# Patient Record
Sex: MALE | Race: WHITE | NOT HISPANIC OR LATINO | Employment: OTHER | URBAN - METROPOLITAN AREA
[De-identification: names, ages, dates, MRNs, and addresses within clinical notes are randomized per-mention and may not be internally consistent; named-entity substitution may affect disease eponyms.]

---

## 2017-02-10 ENCOUNTER — HISTORICAL (OUTPATIENT)
Dept: ADMINISTRATIVE | Facility: HOSPITAL | Age: 57
End: 2017-02-10

## 2017-06-20 ENCOUNTER — HISTORICAL (OUTPATIENT)
Dept: ADMINISTRATIVE | Facility: HOSPITAL | Age: 57
End: 2017-06-20

## 2017-06-20 LAB
ABS NEUT (OLG): 3.04 X10(3)/MCL (ref 2.1–9.2)
ALBUMIN SERPL-MCNC: 4.3 GM/DL (ref 3.4–5)
ALBUMIN/GLOB SERPL: 1.3 {RATIO}
ALP SERPL-CCNC: 75 UNIT/L (ref 50–136)
ALT SERPL-CCNC: 57 UNIT/L (ref 12–78)
AST SERPL-CCNC: 34 UNIT/L (ref 15–37)
BASOPHILS # BLD AUTO: 0 X10(3)/MCL (ref 0–0.2)
BASOPHILS NFR BLD AUTO: 1 %
BILIRUB SERPL-MCNC: 0.7 MG/DL (ref 0.2–1)
BILIRUBIN DIRECT+TOT PNL SERPL-MCNC: 0.2 MG/DL (ref 0–0.2)
BILIRUBIN DIRECT+TOT PNL SERPL-MCNC: 0.5 MG/DL (ref 0–0.8)
BUN SERPL-MCNC: 16 MG/DL (ref 7–18)
CALCIUM SERPL-MCNC: 9.4 MG/DL (ref 8.5–10.1)
CD3+CD4+ CELLS # SPEC: 844 UNIT/L (ref 589–1505)
CD3+CD4+ CELLS NFR BLD: 34.9 % (ref 31–59)
CHLORIDE SERPL-SCNC: 104 MMOL/L (ref 98–107)
CO2 SERPL-SCNC: 26 MMOL/L (ref 21–32)
CREAT SERPL-MCNC: 0.96 MG/DL (ref 0.7–1.3)
EOSINOPHIL # BLD AUTO: 0.1 X10(3)/MCL (ref 0–0.9)
EOSINOPHIL NFR BLD AUTO: 2 %
ERYTHROCYTE [DISTWIDTH] IN BLOOD BY AUTOMATED COUNT: 12.3 % (ref 11.5–17)
GLOBULIN SER-MCNC: 3.3 GM/DL (ref 2.4–3.5)
GLUCOSE SERPL-MCNC: 132 MG/DL (ref 74–106)
HCT VFR BLD AUTO: 47.4 % (ref 42–52)
HGB BLD-MCNC: 16.6 GM/DL (ref 14–18)
LYMPHOCYTES # BLD AUTO: 2.4 X10(3)/MCL (ref 0.6–4.6)
LYMPHOCYTES # BLD AUTO: 2418 UNIT/L (ref 1260–5520)
LYMPHOCYTES NFR BLD AUTO: 39 %
LYMPHOCYTES NFR LN MANUAL: 39 % (ref 28–48)
LYMPHOMA - T-CELL MARKERS SPEC-IMP: NORMAL
MCH RBC QN AUTO: 32.4 PG (ref 27–31)
MCHC RBC AUTO-ENTMCNC: 35 GM/DL (ref 33–36)
MCV RBC AUTO: 92.4 FL (ref 80–94)
MONOCYTES # BLD AUTO: 0.5 X10(3)/MCL (ref 0.1–1.3)
MONOCYTES NFR BLD AUTO: 9 %
NEG CONT SPOT COUNT: 0
NEUTROPHILS # BLD AUTO: 3.04 X10(3)/MCL (ref 1.4–7.9)
NEUTROPHILS NFR BLD AUTO: 49 %
PANEL A SPOT COUNT: 0
PANEL B SPOT COUNT: 0
PLATELET # BLD AUTO: 269 X10(3)/MCL (ref 130–400)
PMV BLD AUTO: 9.4 FL (ref 9.4–12.4)
POS CONT SPOT COUNT: NORMAL
POTASSIUM SERPL-SCNC: 4.2 MMOL/L (ref 3.5–5.1)
PROT SERPL-MCNC: 7.6 GM/DL (ref 6.4–8.2)
RBC # BLD AUTO: 5.13 X10(6)/MCL (ref 4.7–6.1)
RPR SER QL: NORMAL
SODIUM SERPL-SCNC: 138 MMOL/L (ref 136–145)
T-SPOT.TB: NEGATIVE
TSH SERPL-ACNC: 1.59 MIU/ML (ref 0.36–3.74)
WBC # BLD AUTO: 6200 /MM3 (ref 4500–11500)
WBC # SPEC AUTO: 6.2 X10(3)/MCL (ref 4.5–11.5)

## 2017-06-21 ENCOUNTER — HISTORICAL (OUTPATIENT)
Dept: ADMINISTRATIVE | Facility: HOSPITAL | Age: 57
End: 2017-06-21

## 2017-06-21 LAB
APPEARANCE, UA: CLEAR
BACTERIA #/AREA URNS AUTO: ABNORMAL /[HPF]
BILIRUB UR QL STRIP: NEGATIVE
COLOR UR: NORMAL
GLUCOSE (UA): NORMAL
HGB UR QL STRIP: NEGATIVE
HYALINE CASTS #/AREA URNS LPF: ABNORMAL /[LPF]
KETONES UR QL STRIP: NEGATIVE
LEUKOCYTE ESTERASE UR QL STRIP: NEGATIVE
NITRITE UR QL STRIP: NEGATIVE
PH UR STRIP: 5 [PH] (ref 4.5–8)
PROT UR QL STRIP: NEGATIVE
RBC #/AREA URNS AUTO: ABNORMAL /[HPF]
SP GR UR STRIP: 1.02 (ref 1–1.03)
SQUAMOUS #/AREA URNS LPF: ABNORMAL /[LPF]
UROBILINOGEN UR STRIP-ACNC: NORMAL
WBC #/AREA URNS AUTO: ABNORMAL /HPF

## 2017-08-17 ENCOUNTER — HISTORICAL (OUTPATIENT)
Dept: ADMINISTRATIVE | Facility: HOSPITAL | Age: 57
End: 2017-08-17

## 2017-09-18 ENCOUNTER — HISTORICAL (OUTPATIENT)
Dept: ADMINISTRATIVE | Facility: HOSPITAL | Age: 57
End: 2017-09-18

## 2017-09-18 LAB
ABS NEUT (OLG): 3.19 X10(3)/MCL (ref 2.1–9.2)
ALBUMIN SERPL-MCNC: 4.3 GM/DL (ref 3.4–5)
ALBUMIN/GLOB SERPL: 1.2 RATIO (ref 1.1–2)
ALP SERPL-CCNC: 80 UNIT/L (ref 50–136)
ALT SERPL-CCNC: 74 UNIT/L (ref 12–78)
AST SERPL-CCNC: 47 UNIT/L (ref 15–37)
BASOPHILS # BLD AUTO: 0 X10(3)/MCL (ref 0–0.2)
BASOPHILS NFR BLD AUTO: 1 %
BILIRUB SERPL-MCNC: 0.6 MG/DL (ref 0.2–1)
BILIRUBIN DIRECT+TOT PNL SERPL-MCNC: 0.1 MG/DL (ref 0–0.5)
BILIRUBIN DIRECT+TOT PNL SERPL-MCNC: 0.5 MG/DL (ref 0–0.8)
BUN SERPL-MCNC: 14 MG/DL (ref 7–18)
CALCIUM SERPL-MCNC: 10 MG/DL (ref 8.5–10.1)
CD3+CD4+ CELLS # SPEC: NORMAL UNIT/L (ref 589–1505)
CD3+CD4+ CELLS NFR BLD: 35 % (ref 31–59)
CHLORIDE SERPL-SCNC: 104 MMOL/L (ref 98–107)
CO2 SERPL-SCNC: 27 MMOL/L (ref 21–32)
CREAT SERPL-MCNC: 0.99 MG/DL (ref 0.7–1.3)
EOSINOPHIL # BLD AUTO: 0.1 X10(3)/MCL (ref 0–0.9)
EOSINOPHIL NFR BLD AUTO: 2 %
ERYTHROCYTE [DISTWIDTH] IN BLOOD BY AUTOMATED COUNT: 12.6 % (ref 11.5–17)
GLOBULIN SER-MCNC: 3.6 GM/DL (ref 2.4–3.5)
GLUCOSE SERPL-MCNC: 125 MG/DL (ref 74–106)
HCT VFR BLD AUTO: 47.9 % (ref 42–52)
HGB BLD-MCNC: 16.7 GM/DL (ref 14–18)
LYMPHOCYTES # BLD AUTO: 2.6 X10(3)/MCL (ref 0.6–4.6)
LYMPHOCYTES # BLD AUTO: 2640 UNIT/L (ref 1260–5520)
LYMPHOCYTES NFR BLD AUTO: 40 %
LYMPHOCYTES NFR LN MANUAL: 40 % (ref 28–48)
LYMPHOMA - T-CELL MARKERS SPEC-IMP: NORMAL
MCH RBC QN AUTO: 32.9 PG (ref 27–31)
MCHC RBC AUTO-ENTMCNC: 34.9 GM/DL (ref 33–36)
MCV RBC AUTO: 94.5 FL (ref 80–94)
MONOCYTES # BLD AUTO: 0.6 X10(3)/MCL (ref 0.1–1.3)
MONOCYTES NFR BLD AUTO: 8 %
NEUTROPHILS # BLD AUTO: 3.19 X10(3)/MCL (ref 1.4–7.9)
NEUTROPHILS NFR BLD AUTO: 48 %
PLATELET # BLD AUTO: 274 X10(3)/MCL (ref 130–400)
PMV BLD AUTO: 8.9 FL (ref 9.4–12.4)
POTASSIUM SERPL-SCNC: 4.5 MMOL/L (ref 3.5–5.1)
PROT SERPL-MCNC: 7.9 GM/DL (ref 6.4–8.2)
RBC # BLD AUTO: 5.07 X10(6)/MCL (ref 4.7–6.1)
SODIUM SERPL-SCNC: 141 MMOL/L (ref 136–145)
WBC # BLD AUTO: 6600 /MM3 (ref 4500–11500)
WBC # SPEC AUTO: 6.6 X10(3)/MCL (ref 4.5–11.5)

## 2018-01-05 ENCOUNTER — HISTORICAL (OUTPATIENT)
Dept: ADMINISTRATIVE | Facility: HOSPITAL | Age: 58
End: 2018-01-05

## 2018-01-05 LAB
ABS NEUT (OLG): 2.49 X10(3)/MCL (ref 2.1–9.2)
ABS NEUT (OLG): 2.55 X10(3)/MCL (ref 2.1–9.2)
ALBUMIN SERPL-MCNC: 4.1 GM/DL (ref 3.4–5)
ALBUMIN/GLOB SERPL: 1.2 RATIO (ref 1.1–2)
ALP SERPL-CCNC: 72 UNIT/L (ref 50–136)
ALT SERPL-CCNC: 75 UNIT/L (ref 12–78)
AST SERPL-CCNC: 43 UNIT/L (ref 15–37)
BASOPHILS # BLD AUTO: 0 X10(3)/MCL (ref 0–0.2)
BASOPHILS # BLD AUTO: 0 X10(3)/MCL (ref 0–0.2)
BASOPHILS NFR BLD AUTO: 1 %
BASOPHILS NFR BLD AUTO: 1 %
BILIRUB SERPL-MCNC: 0.7 MG/DL (ref 0.2–1)
BILIRUBIN DIRECT+TOT PNL SERPL-MCNC: 0.2 MG/DL (ref 0–0.5)
BILIRUBIN DIRECT+TOT PNL SERPL-MCNC: 0.5 MG/DL (ref 0–0.8)
BUN SERPL-MCNC: 18 MG/DL (ref 7–18)
CALCIUM SERPL-MCNC: 9.4 MG/DL (ref 8.5–10.1)
CD3+CD4+ CELLS # SPEC: 799 UNIT/L (ref 589–1505)
CD3+CD4+ CELLS NFR BLD: 38.4 % (ref 31–59)
CHLORIDE SERPL-SCNC: 101 MMOL/L (ref 98–107)
CHOLEST SERPL-MCNC: 152 MG/DL (ref 0–200)
CHOLEST/HDLC SERPL: 3.1 {RATIO} (ref 0–5)
CO2 SERPL-SCNC: 25 MMOL/L (ref 21–32)
CREAT SERPL-MCNC: 0.84 MG/DL (ref 0.7–1.3)
DEPRECATED CALCIDIOL+CALCIFEROL SERPL-MC: 39.29 NG/ML (ref 30–80)
EOSINOPHIL # BLD AUTO: 0.1 X10(3)/MCL (ref 0–0.9)
EOSINOPHIL # BLD AUTO: 0.1 X10(3)/MCL (ref 0–0.9)
EOSINOPHIL NFR BLD AUTO: 1 %
EOSINOPHIL NFR BLD AUTO: 2 %
ERYTHROCYTE [DISTWIDTH] IN BLOOD BY AUTOMATED COUNT: 12.3 % (ref 11.5–17)
ERYTHROCYTE [DISTWIDTH] IN BLOOD BY AUTOMATED COUNT: 12.3 % (ref 11.5–17)
EST. AVERAGE GLUCOSE BLD GHB EST-MCNC: 154 MG/DL
GLOBULIN SER-MCNC: 3.3 GM/DL (ref 2.4–3.5)
GLUCOSE SERPL-MCNC: 168 MG/DL (ref 74–106)
HBA1C MFR BLD: 7 % (ref 4.2–6.3)
HCT VFR BLD AUTO: 45.1 % (ref 42–52)
HCT VFR BLD AUTO: 46.4 % (ref 42–52)
HCV AB SERPL QL IA: NEGATIVE
HDLC SERPL-MCNC: 49 MG/DL (ref 35–60)
HGB BLD-MCNC: 15.9 GM/DL (ref 14–18)
HGB BLD-MCNC: 15.9 GM/DL (ref 14–18)
LDLC SERPL CALC-MCNC: 47 MG/DL (ref 0–129)
LYMPHOCYTES # BLD AUTO: 2.1 X10(3)/MCL (ref 0.6–4.6)
LYMPHOCYTES # BLD AUTO: 2.3 X10(3)/MCL (ref 0.6–4.6)
LYMPHOCYTES # BLD AUTO: 2080 UNIT/L (ref 1260–5520)
LYMPHOCYTES NFR BLD AUTO: 40 %
LYMPHOCYTES NFR BLD AUTO: 41 %
LYMPHOCYTES NFR LN MANUAL: 40 % (ref 28–48)
LYMPHOMA - T-CELL MARKERS SPEC-IMP: NORMAL
MCH RBC QN AUTO: 31.5 PG (ref 27–31)
MCH RBC QN AUTO: 32 PG (ref 27–31)
MCHC RBC AUTO-ENTMCNC: 34.3 GM/DL (ref 33–36)
MCHC RBC AUTO-ENTMCNC: 35.3 GM/DL (ref 33–36)
MCV RBC AUTO: 90.7 FL (ref 80–94)
MCV RBC AUTO: 91.9 FL (ref 80–94)
MONOCYTES # BLD AUTO: 0.5 X10(3)/MCL (ref 0.1–1.3)
MONOCYTES # BLD AUTO: 0.6 X10(3)/MCL (ref 0.1–1.3)
MONOCYTES NFR BLD AUTO: 10 %
MONOCYTES NFR BLD AUTO: 9 %
NEUTROPHILS # BLD AUTO: 2.49 X10(3)/MCL (ref 2.1–9.2)
NEUTROPHILS # BLD AUTO: 2.55 X10(3)/MCL (ref 1.4–7.9)
NEUTROPHILS NFR BLD AUTO: 46 %
NEUTROPHILS NFR BLD AUTO: 48 %
PLATELET # BLD AUTO: 246 X10(3)/MCL (ref 130–400)
PLATELET # BLD AUTO: 258 X10(3)/MCL (ref 130–400)
PMV BLD AUTO: 9.3 FL (ref 9.4–12.4)
PMV BLD AUTO: 9.4 FL (ref 9.4–12.4)
POTASSIUM SERPL-SCNC: 4.1 MMOL/L (ref 3.5–5.1)
PROT SERPL-MCNC: 7.4 GM/DL (ref 6.4–8.2)
RBC # BLD AUTO: 4.97 X10(6)/MCL (ref 4.7–6.1)
RBC # BLD AUTO: 5.05 X10(6)/MCL (ref 4.7–6.1)
RPR SER QL: REACTIVE
SODIUM SERPL-SCNC: 135 MMOL/L (ref 136–145)
TRIGL SERPL-MCNC: 279 MG/DL (ref 30–150)
VLDLC SERPL CALC-MCNC: 56 MG/DL
WBC # BLD AUTO: 5200 /MM3 (ref 4500–11500)
WBC # SPEC AUTO: 5.2 X10(3)/MCL (ref 4.5–11.5)
WBC # SPEC AUTO: 5.6 X10(3)/MCL (ref 4.5–11.5)

## 2018-02-19 ENCOUNTER — HISTORICAL (OUTPATIENT)
Dept: BARIATRICS | Facility: HOSPITAL | Age: 58
End: 2018-02-19

## 2018-03-29 ENCOUNTER — HISTORICAL (OUTPATIENT)
Dept: BARIATRICS | Facility: HOSPITAL | Age: 58
End: 2018-03-29

## 2018-04-04 ENCOUNTER — HISTORICAL (OUTPATIENT)
Dept: ADMINISTRATIVE | Facility: HOSPITAL | Age: 58
End: 2018-04-04

## 2018-04-04 LAB
ABS NEUT (OLG): 3.09 X10(3)/MCL (ref 2.1–9.2)
ALBUMIN SERPL-MCNC: 4.4 GM/DL (ref 3.4–5)
ALBUMIN/GLOB SERPL: 1.3 RATIO (ref 1.1–2)
ALP SERPL-CCNC: 82 UNIT/L (ref 50–136)
ALT SERPL-CCNC: 68 UNIT/L (ref 12–78)
AST SERPL-CCNC: 42 UNIT/L (ref 15–37)
BASOPHILS # BLD AUTO: 0 X10(3)/MCL (ref 0–0.2)
BASOPHILS NFR BLD AUTO: 1 %
BILIRUB SERPL-MCNC: 0.6 MG/DL (ref 0.2–1)
BILIRUBIN DIRECT+TOT PNL SERPL-MCNC: 0.2 MG/DL (ref 0–0.5)
BILIRUBIN DIRECT+TOT PNL SERPL-MCNC: 0.4 MG/DL (ref 0–0.8)
BUN SERPL-MCNC: 22 MG/DL (ref 7–18)
CALCIUM SERPL-MCNC: 9.9 MG/DL (ref 8.5–10.1)
CD3+CD4+ CELLS # SPEC: 702 UNIT/L (ref 589–1505)
CD3+CD4+ CELLS NFR BLD: 35 % (ref 31–59)
CHLORIDE SERPL-SCNC: 100 MMOL/L (ref 98–107)
CHOLEST SERPL-MCNC: 155 MG/DL (ref 0–200)
CHOLEST/HDLC SERPL: 3.8 {RATIO} (ref 0–5)
CO2 SERPL-SCNC: 27 MMOL/L (ref 21–32)
CREAT SERPL-MCNC: 1.12 MG/DL (ref 0.7–1.3)
EOSINOPHIL # BLD AUTO: 0.1 X10(3)/MCL (ref 0–0.9)
EOSINOPHIL NFR BLD AUTO: 2 %
ERYTHROCYTE [DISTWIDTH] IN BLOOD BY AUTOMATED COUNT: 12.9 % (ref 11.5–17)
EST. AVERAGE GLUCOSE BLD GHB EST-MCNC: 140 MG/DL
GLOBULIN SER-MCNC: 3.5 GM/DL (ref 2.4–3.5)
GLUCOSE SERPL-MCNC: 142 MG/DL (ref 74–106)
HBA1C MFR BLD: 6.5 % (ref 4.2–6.3)
HCT VFR BLD AUTO: 45.3 % (ref 42–52)
HDLC SERPL-MCNC: 41 MG/DL (ref 35–60)
HGB BLD-MCNC: 15.4 GM/DL (ref 14–18)
LDLC SERPL CALC-MCNC: 68 MG/DL (ref 0–129)
LYMPHOCYTES # BLD AUTO: 2 X10(3)/MCL (ref 0.6–4.6)
LYMPHOCYTES # BLD AUTO: 2006 UNIT/L (ref 1260–5520)
LYMPHOCYTES NFR BLD AUTO: 34 %
LYMPHOCYTES NFR LN MANUAL: 34 % (ref 28–48)
LYMPHOMA - T-CELL MARKERS SPEC-IMP: NORMAL
MCH RBC QN AUTO: 32.8 PG (ref 27–31)
MCHC RBC AUTO-ENTMCNC: 34 GM/DL (ref 33–36)
MCV RBC AUTO: 96.6 FL (ref 80–94)
MONOCYTES # BLD AUTO: 0.6 X10(3)/MCL (ref 0.1–1.3)
MONOCYTES NFR BLD AUTO: 10 %
NEUTROPHILS # BLD AUTO: 3.09 X10(3)/MCL (ref 1.4–7.9)
NEUTROPHILS NFR BLD AUTO: 52 %
PLATELET # BLD AUTO: 292 X10(3)/MCL (ref 130–400)
PMV BLD AUTO: 9.5 FL (ref 9.4–12.4)
POTASSIUM SERPL-SCNC: 4.3 MMOL/L (ref 3.5–5.1)
PROT SERPL-MCNC: 7.9 GM/DL (ref 6.4–8.2)
PSA SERPL-MCNC: 0.5 NG/ML (ref 0–4)
RBC # BLD AUTO: 4.69 X10(6)/MCL (ref 4.7–6.1)
RPR SER QL: NORMAL
SODIUM SERPL-SCNC: 137 MMOL/L (ref 136–145)
TRIGL SERPL-MCNC: 229 MG/DL (ref 30–150)
VLDLC SERPL CALC-MCNC: 46 MG/DL
WBC # BLD AUTO: 5900 /MM3 (ref 4500–11500)
WBC # SPEC AUTO: 5.9 X10(3)/MCL (ref 4.5–11.5)

## 2018-04-25 ENCOUNTER — HISTORICAL (OUTPATIENT)
Dept: BARIATRICS | Facility: HOSPITAL | Age: 58
End: 2018-04-25

## 2018-05-30 ENCOUNTER — HISTORICAL (OUTPATIENT)
Dept: BARIATRICS | Facility: HOSPITAL | Age: 58
End: 2018-05-30

## 2018-06-20 ENCOUNTER — HISTORICAL (OUTPATIENT)
Dept: ADMINISTRATIVE | Facility: HOSPITAL | Age: 58
End: 2018-06-20

## 2018-06-20 LAB — H PYLORI AB SER IA-ACNC: NEGATIVE

## 2018-06-27 ENCOUNTER — HISTORICAL (OUTPATIENT)
Dept: BARIATRICS | Facility: HOSPITAL | Age: 58
End: 2018-06-27

## 2018-07-18 ENCOUNTER — HISTORICAL (OUTPATIENT)
Dept: ADMINISTRATIVE | Facility: HOSPITAL | Age: 58
End: 2018-07-18

## 2018-07-18 LAB
ABS NEUT (OLG): 2.97 X10(3)/MCL (ref 2.1–9.2)
ALBUMIN SERPL-MCNC: 4 GM/DL (ref 3.4–5)
ALBUMIN/GLOB SERPL: 1.1 RATIO (ref 1.1–2)
ALP SERPL-CCNC: 82 UNIT/L (ref 50–136)
ALT SERPL-CCNC: 88 UNIT/L (ref 12–78)
AST SERPL-CCNC: 49 UNIT/L (ref 15–37)
BASOPHILS # BLD AUTO: 0 X10(3)/MCL (ref 0–0.2)
BASOPHILS NFR BLD AUTO: 1 %
BILIRUB SERPL-MCNC: 0.4 MG/DL (ref 0.2–1)
BILIRUBIN DIRECT+TOT PNL SERPL-MCNC: 0.1 MG/DL (ref 0–0.5)
BILIRUBIN DIRECT+TOT PNL SERPL-MCNC: 0.3 MG/DL (ref 0–0.8)
BUN SERPL-MCNC: 18 MG/DL (ref 7–18)
CALCIUM SERPL-MCNC: 9.4 MG/DL (ref 8.5–10.1)
CD3+CD4+ CELLS # SPEC: 816 UNIT/L (ref 589–1505)
CD3+CD4+ CELLS NFR BLD: 37.4 % (ref 31–59)
CHLORIDE SERPL-SCNC: 107 MMOL/L (ref 98–107)
CO2 SERPL-SCNC: 26 MMOL/L (ref 21–32)
CREAT SERPL-MCNC: 0.92 MG/DL (ref 0.7–1.3)
EOSINOPHIL # BLD AUTO: 0.1 X10(3)/MCL (ref 0–0.9)
EOSINOPHIL NFR BLD AUTO: 2 %
ERYTHROCYTE [DISTWIDTH] IN BLOOD BY AUTOMATED COUNT: 12.1 % (ref 11.5–17)
GLOBULIN SER-MCNC: 3.6 GM/DL (ref 2.4–3.5)
GLUCOSE SERPL-MCNC: 125 MG/DL (ref 74–106)
HCT VFR BLD AUTO: 45.6 % (ref 42–52)
HGB BLD-MCNC: 15.4 GM/DL (ref 14–18)
LYMPHOCYTES # BLD AUTO: 2.2 X10(3)/MCL (ref 0.6–4.6)
LYMPHOCYTES # BLD AUTO: 2183 UNIT/L (ref 1260–5520)
LYMPHOCYTES NFR BLD AUTO: 37 %
LYMPHOCYTES NFR LN MANUAL: 37 % (ref 28–48)
LYMPHOMA - T-CELL MARKERS SPEC-IMP: NORMAL
MCH RBC QN AUTO: 32 PG (ref 27–31)
MCHC RBC AUTO-ENTMCNC: 33.8 GM/DL (ref 33–36)
MCV RBC AUTO: 94.8 FL (ref 80–94)
MONOCYTES # BLD AUTO: 0.6 X10(3)/MCL (ref 0.1–1.3)
MONOCYTES NFR BLD AUTO: 9 %
NEUTROPHILS # BLD AUTO: 2.97 X10(3)/MCL (ref 2.1–9.2)
NEUTROPHILS NFR BLD AUTO: 50 %
PLATELET # BLD AUTO: 246 X10(3)/MCL (ref 130–400)
PMV BLD AUTO: 9.4 FL (ref 9.4–12.4)
POTASSIUM SERPL-SCNC: 4.5 MMOL/L (ref 3.5–5.1)
PROT SERPL-MCNC: 7.6 GM/DL (ref 6.4–8.2)
RBC # BLD AUTO: 4.81 X10(6)/MCL (ref 4.7–6.1)
SODIUM SERPL-SCNC: 140 MMOL/L (ref 136–145)
WBC # BLD AUTO: 5900 /MM3 (ref 4500–11500)
WBC # SPEC AUTO: 5.9 X10(3)/MCL (ref 4.5–11.5)

## 2018-07-26 ENCOUNTER — HISTORICAL (OUTPATIENT)
Dept: SURGERY | Facility: HOSPITAL | Age: 58
End: 2018-07-26

## 2018-07-30 ENCOUNTER — HISTORICAL (OUTPATIENT)
Dept: BARIATRICS | Facility: HOSPITAL | Age: 58
End: 2018-07-30

## 2018-10-02 ENCOUNTER — HISTORICAL (OUTPATIENT)
Dept: BARIATRICS | Facility: HOSPITAL | Age: 58
End: 2018-10-02

## 2018-10-22 ENCOUNTER — HISTORICAL (OUTPATIENT)
Dept: ADMINISTRATIVE | Facility: HOSPITAL | Age: 58
End: 2018-10-22

## 2018-10-22 LAB
ABS NEUT (OLG): 2.27 X10(3)/MCL (ref 2.1–9.2)
ALBUMIN SERPL-MCNC: 4.4 GM/DL (ref 3.4–5)
ALBUMIN/GLOB SERPL: 1.2 {RATIO}
ALP SERPL-CCNC: 72 UNIT/L (ref 50–136)
ALT SERPL-CCNC: 113 UNIT/L (ref 12–78)
AST SERPL-CCNC: 78 UNIT/L (ref 15–37)
BASOPHILS # BLD AUTO: 0 X10(3)/MCL (ref 0–0.2)
BASOPHILS NFR BLD AUTO: 1 %
BILIRUB SERPL-MCNC: 1 MG/DL (ref 0.2–1)
BILIRUBIN DIRECT+TOT PNL SERPL-MCNC: 0.2 MG/DL (ref 0–0.2)
BILIRUBIN DIRECT+TOT PNL SERPL-MCNC: 0.8 MG/DL (ref 0–0.8)
BUN SERPL-MCNC: 20 MG/DL (ref 7–18)
CALCIUM SERPL-MCNC: 9.8 MG/DL (ref 8.5–10.1)
CD3+CD4+ CELLS # SPEC: 751 UNIT/L (ref 589–1505)
CD3+CD4+ CELLS NFR BLD: 38.3 % (ref 31–59)
CHLORIDE SERPL-SCNC: 103 MMOL/L (ref 98–107)
CHOLEST SERPL-MCNC: 144 MG/DL (ref 0–200)
CHOLEST/HDLC SERPL: 3.7 {RATIO} (ref 0–5)
CO2 SERPL-SCNC: 26 MMOL/L (ref 21–32)
CREAT SERPL-MCNC: 1.21 MG/DL (ref 0.7–1.3)
DEPRECATED CALCIDIOL+CALCIFEROL SERPL-MC: 37.63 NG/ML (ref 30–80)
EOSINOPHIL # BLD AUTO: 0.1 X10(3)/MCL (ref 0–0.9)
EOSINOPHIL NFR BLD AUTO: 1 %
ERYTHROCYTE [DISTWIDTH] IN BLOOD BY AUTOMATED COUNT: 12.4 % (ref 11.5–17)
EST. AVERAGE GLUCOSE BLD GHB EST-MCNC: 154 MG/DL
GLOBULIN SER-MCNC: 3.7 GM/DL (ref 2.4–3.5)
GLUCOSE SERPL-MCNC: 108 MG/DL (ref 74–106)
HBA1C MFR BLD: 7 % (ref 4.2–6.3)
HCT VFR BLD AUTO: 46.3 % (ref 42–52)
HCV AB SERPL QL IA: NEGATIVE
HDLC SERPL-MCNC: 39 MG/DL (ref 35–60)
HGB BLD-MCNC: 15.4 GM/DL (ref 14–18)
LDLC SERPL CALC-MCNC: 57 MG/DL (ref 0–129)
LYMPHOCYTES # BLD AUTO: 1960 UNIT/L (ref 1260–5520)
LYMPHOCYTES # BLD AUTO: 2 X10(3)/MCL (ref 0.6–4.6)
LYMPHOCYTES NFR BLD AUTO: 40 %
LYMPHOCYTES NFR LN MANUAL: 40 % (ref 28–48)
LYMPHOMA - T-CELL MARKERS SPEC-IMP: NORMAL
MCH RBC QN AUTO: 32.2 PG (ref 27–31)
MCHC RBC AUTO-ENTMCNC: 33.3 GM/DL (ref 33–36)
MCV RBC AUTO: 96.9 FL (ref 80–94)
MONOCYTES # BLD AUTO: 0.5 X10(3)/MCL (ref 0.1–1.3)
MONOCYTES NFR BLD AUTO: 11 %
NEG CONT SPOT COUNT: NORMAL
NEUTROPHILS # BLD AUTO: 2.27 X10(3)/MCL (ref 2.1–9.2)
NEUTROPHILS NFR BLD AUTO: 46 %
PANEL A SPOT COUNT: 0
PANEL B SPOT COUNT: 0
PLATELET # BLD AUTO: 276 X10(3)/MCL (ref 130–400)
PMV BLD AUTO: 9.2 FL (ref 9.4–12.4)
POS CONT SPOT COUNT: NORMAL
POTASSIUM SERPL-SCNC: 4.3 MMOL/L (ref 3.5–5.1)
PROT SERPL-MCNC: 8.1 GM/DL (ref 6.4–8.2)
RBC # BLD AUTO: 4.78 X10(6)/MCL (ref 4.7–6.1)
RPR SER QL: REACTIVE
SODIUM SERPL-SCNC: 139 MMOL/L (ref 136–145)
T PALLIDUM AB SER QL: ABNORMAL
T-SPOT.TB: NORMAL
TRIGL SERPL-MCNC: 241 MG/DL (ref 30–150)
TSH SERPL-ACNC: 3.21 MIU/L (ref 0.36–3.74)
VLDLC SERPL CALC-MCNC: 48 MG/DL
WBC # BLD AUTO: 4900 /MM3 (ref 4500–11500)
WBC # SPEC AUTO: 4.9 X10(3)/MCL (ref 4.5–11.5)

## 2018-10-23 ENCOUNTER — HISTORICAL (OUTPATIENT)
Dept: BARIATRICS | Facility: HOSPITAL | Age: 58
End: 2018-10-23

## 2018-10-23 ENCOUNTER — HISTORICAL (OUTPATIENT)
Dept: LAB | Facility: HOSPITAL | Age: 58
End: 2018-10-23

## 2018-10-24 LAB — GRAM STN SPEC: NORMAL

## 2018-10-26 LAB
FINAL CULTURE: NO GROWTH
FINAL CULTURE: NORMAL

## 2018-11-12 ENCOUNTER — HISTORICAL (OUTPATIENT)
Dept: BARIATRICS | Facility: HOSPITAL | Age: 58
End: 2018-11-12

## 2018-12-19 ENCOUNTER — HISTORICAL (OUTPATIENT)
Dept: ADMINISTRATIVE | Facility: HOSPITAL | Age: 58
End: 2018-12-19

## 2018-12-19 LAB
ALBUMIN SERPL-MCNC: 4.2 GM/DL (ref 3.4–5)
ALBUMIN/GLOB SERPL: 1.3 {RATIO}
ALP SERPL-CCNC: 73 UNIT/L (ref 50–136)
ALT SERPL-CCNC: 61 UNIT/L (ref 12–78)
AST SERPL-CCNC: 37 UNIT/L (ref 15–37)
BILIRUB SERPL-MCNC: 0.7 MG/DL (ref 0.2–1)
BILIRUBIN DIRECT+TOT PNL SERPL-MCNC: 0.2 MG/DL (ref 0–0.2)
BILIRUBIN DIRECT+TOT PNL SERPL-MCNC: 0.5 MG/DL (ref 0–0.8)
BUN SERPL-MCNC: 17 MG/DL (ref 7–18)
CALCIUM SERPL-MCNC: 9.6 MG/DL (ref 8.5–10.1)
CHLORIDE SERPL-SCNC: 102 MMOL/L (ref 98–107)
CO2 SERPL-SCNC: 27 MMOL/L (ref 21–32)
CREAT SERPL-MCNC: 1.02 MG/DL (ref 0.7–1.3)
ERYTHROCYTE [DISTWIDTH] IN BLOOD BY AUTOMATED COUNT: 13 % (ref 11.5–17)
EST. AVERAGE GLUCOSE BLD GHB EST-MCNC: 120 MG/DL
GLOBULIN SER-MCNC: 3.2 GM/DL (ref 2.4–3.5)
GLUCOSE SERPL-MCNC: 115 MG/DL (ref 74–106)
HBA1C MFR BLD: 5.8 % (ref 4.2–6.3)
HCT VFR BLD AUTO: 43.9 % (ref 42–52)
HGB BLD-MCNC: 14.5 GM/DL (ref 14–18)
IRON SATN MFR SERPL: 36.1 % (ref 20–50)
IRON SERPL-MCNC: 132 MCG/DL (ref 50–175)
MCH RBC QN AUTO: 32.1 PG (ref 27–31)
MCHC RBC AUTO-ENTMCNC: 33 GM/DL (ref 33–36)
MCV RBC AUTO: 97.1 FL (ref 80–94)
PLATELET # BLD AUTO: 259 X10(3)/MCL (ref 130–400)
PMV BLD AUTO: 9.6 FL (ref 9.4–12.4)
POTASSIUM SERPL-SCNC: 4.3 MMOL/L (ref 3.5–5.1)
PROT SERPL-MCNC: 7.4 GM/DL (ref 6.4–8.2)
RBC # BLD AUTO: 4.52 X10(6)/MCL (ref 4.7–6.1)
SODIUM SERPL-SCNC: 136 MMOL/L (ref 136–145)
TIBC SERPL-MCNC: 366 MCG/DL (ref 250–450)
TRANSFERRIN SERPL-MCNC: 281 MG/DL (ref 200–360)
VIT B12 SERPL-MCNC: 423 PG/ML (ref 193–986)
WBC # SPEC AUTO: 5 X10(3)/MCL (ref 4.5–11.5)

## 2018-12-26 ENCOUNTER — HISTORICAL (OUTPATIENT)
Dept: BARIATRICS | Facility: HOSPITAL | Age: 58
End: 2018-12-26

## 2019-01-24 ENCOUNTER — HISTORICAL (OUTPATIENT)
Dept: ADMINISTRATIVE | Facility: HOSPITAL | Age: 59
End: 2019-01-24

## 2019-01-24 LAB
ABS NEUT (OLG): 1.72 X10(3)/MCL (ref 2.1–9.2)
BASOPHILS # BLD AUTO: 0 X10(3)/MCL (ref 0–0.2)
BASOPHILS NFR BLD AUTO: 1 %
EOSINOPHIL # BLD AUTO: 0.1 X10(3)/MCL (ref 0–0.9)
EOSINOPHIL NFR BLD AUTO: 2 %
ERYTHROCYTE [DISTWIDTH] IN BLOOD BY AUTOMATED COUNT: 12.2 % (ref 11.5–17)
EST. AVERAGE GLUCOSE BLD GHB EST-MCNC: 120 MG/DL
HBA1C MFR BLD: 5.8 % (ref 4.2–6.3)
HCT VFR BLD AUTO: 45.5 % (ref 42–52)
HGB BLD-MCNC: 15.1 GM/DL (ref 14–18)
LYMPHOCYTES # BLD AUTO: 2.6 X10(3)/MCL (ref 0.6–4.6)
LYMPHOCYTES NFR BLD AUTO: 53 %
MCH RBC QN AUTO: 31.8 PG (ref 27–31)
MCHC RBC AUTO-ENTMCNC: 33.2 GM/DL (ref 33–36)
MCV RBC AUTO: 95.8 FL (ref 80–94)
MONOCYTES # BLD AUTO: 0.5 X10(3)/MCL (ref 0.1–1.3)
MONOCYTES NFR BLD AUTO: 10 %
NEUTROPHILS # BLD AUTO: 1.72 X10(3)/MCL (ref 2.1–9.2)
NEUTROPHILS NFR BLD AUTO: 35 %
PLATELET # BLD AUTO: 258 X10(3)/MCL (ref 130–400)
PMV BLD AUTO: 9.4 FL (ref 9.4–12.4)
PSA SERPL-MCNC: 0.5 NG/ML (ref 0–4)
RBC # BLD AUTO: 4.75 X10(6)/MCL (ref 4.7–6.1)
WBC # SPEC AUTO: 4.9 X10(3)/MCL (ref 4.5–11.5)

## 2019-02-25 ENCOUNTER — HISTORICAL (OUTPATIENT)
Dept: ADMINISTRATIVE | Facility: HOSPITAL | Age: 59
End: 2019-02-25

## 2019-02-25 LAB
ABS NEUT (OLG): 1.96 X10(3)/MCL (ref 2.1–9.2)
ALBUMIN SERPL-MCNC: 4.1 GM/DL (ref 3.4–5)
ALBUMIN/GLOB SERPL: 1.2 RATIO (ref 1.1–2)
ALP SERPL-CCNC: 80 UNIT/L (ref 50–136)
ALT SERPL-CCNC: 30 UNIT/L (ref 12–78)
AST SERPL-CCNC: 16 UNIT/L (ref 15–37)
BASOPHILS # BLD AUTO: 0 X10(3)/MCL (ref 0–0.2)
BASOPHILS NFR BLD AUTO: 1 %
BILIRUB SERPL-MCNC: 0.6 MG/DL (ref 0.2–1)
BILIRUBIN DIRECT+TOT PNL SERPL-MCNC: 0.2 MG/DL (ref 0–0.5)
BILIRUBIN DIRECT+TOT PNL SERPL-MCNC: 0.4 MG/DL (ref 0–0.8)
BUN SERPL-MCNC: 21 MG/DL (ref 7–18)
CALCIUM SERPL-MCNC: 10 MG/DL (ref 8.5–10.1)
CD3+CD4+ CELLS # SPEC: 933 UNIT/L (ref 589–1505)
CD3+CD4+ CELLS NFR BLD: 36.6 % (ref 31–59)
CHLORIDE SERPL-SCNC: 103 MMOL/L (ref 98–107)
CO2 SERPL-SCNC: 28 MMOL/L (ref 21–32)
CREAT SERPL-MCNC: 1.09 MG/DL (ref 0.7–1.3)
EOSINOPHIL # BLD AUTO: 0.1 X10(3)/MCL (ref 0–0.9)
EOSINOPHIL NFR BLD AUTO: 2 %
ERYTHROCYTE [DISTWIDTH] IN BLOOD BY AUTOMATED COUNT: 12.7 % (ref 11.5–17)
GLOBULIN SER-MCNC: 3.5 GM/DL (ref 2.4–3.5)
GLUCOSE SERPL-MCNC: 109 MG/DL (ref 74–106)
HCT VFR BLD AUTO: 43.7 % (ref 42–52)
HGB BLD-MCNC: 14.5 GM/DL (ref 14–18)
LYMPHOCYTES # BLD AUTO: 2.6 X10(3)/MCL (ref 0.6–4.6)
LYMPHOCYTES # BLD AUTO: 2550 UNIT/L (ref 1260–5520)
LYMPHOCYTES NFR BLD AUTO: 50 %
LYMPHOCYTES NFR LN MANUAL: 50 % (ref 28–48)
LYMPHOMA - T-CELL MARKERS SPEC-IMP: ABNORMAL
MCH RBC QN AUTO: 32.3 PG (ref 27–31)
MCHC RBC AUTO-ENTMCNC: 33.2 GM/DL (ref 33–36)
MCV RBC AUTO: 97.3 FL (ref 80–94)
MONOCYTES # BLD AUTO: 0.5 X10(3)/MCL (ref 0.1–1.3)
MONOCYTES NFR BLD AUTO: 9 %
NEUTROPHILS # BLD AUTO: 1.96 X10(3)/MCL (ref 2.1–9.2)
NEUTROPHILS NFR BLD AUTO: 38 %
PLATELET # BLD AUTO: 272 X10(3)/MCL (ref 130–400)
PMV BLD AUTO: 10.2 FL (ref 9.4–12.4)
POTASSIUM SERPL-SCNC: 4.1 MMOL/L (ref 3.5–5.1)
PROT SERPL-MCNC: 7.6 GM/DL (ref 6.4–8.2)
RBC # BLD AUTO: 4.49 X10(6)/MCL (ref 4.7–6.1)
SODIUM SERPL-SCNC: 139 MMOL/L (ref 136–145)
WBC # BLD AUTO: 5100 /MM3 (ref 4500–11500)
WBC # SPEC AUTO: 5.1 X10(3)/MCL (ref 4.5–11.5)

## 2019-02-28 ENCOUNTER — HISTORICAL (OUTPATIENT)
Dept: BARIATRICS | Facility: HOSPITAL | Age: 59
End: 2019-02-28

## 2019-03-13 LAB — CRC RECOMMENDATION EXT: NORMAL

## 2019-04-25 ENCOUNTER — HISTORICAL (OUTPATIENT)
Dept: ADMINISTRATIVE | Facility: HOSPITAL | Age: 59
End: 2019-04-25

## 2019-04-25 LAB
ABS NEUT (OLG): 2.18 X10(3)/MCL (ref 2.1–9.2)
ALBUMIN SERPL-MCNC: 4.1 GM/DL (ref 3.4–5)
ALBUMIN/GLOB SERPL: 1.1 RATIO (ref 1.1–2)
ALP SERPL-CCNC: 86 UNIT/L (ref 50–136)
ALT SERPL-CCNC: 26 UNIT/L (ref 12–78)
AST SERPL-CCNC: 16 UNIT/L (ref 15–37)
BASOPHILS # BLD AUTO: 0 X10(3)/MCL (ref 0–0.2)
BASOPHILS NFR BLD AUTO: 0 %
BILIRUB SERPL-MCNC: 0.6 MG/DL (ref 0.2–1)
BILIRUBIN DIRECT+TOT PNL SERPL-MCNC: 0.2 MG/DL (ref 0–0.5)
BILIRUBIN DIRECT+TOT PNL SERPL-MCNC: 0.4 MG/DL (ref 0–0.8)
BUN SERPL-MCNC: 12 MG/DL (ref 7–18)
CALCIUM SERPL-MCNC: 10 MG/DL (ref 8.5–10.1)
CHLORIDE SERPL-SCNC: 106 MMOL/L (ref 98–107)
CO2 SERPL-SCNC: 27 MMOL/L (ref 21–32)
CREAT SERPL-MCNC: 0.95 MG/DL (ref 0.7–1.3)
EOSINOPHIL # BLD AUTO: 0.1 X10(3)/MCL (ref 0–0.9)
EOSINOPHIL NFR BLD AUTO: 2 %
ERYTHROCYTE [DISTWIDTH] IN BLOOD BY AUTOMATED COUNT: 12.7 % (ref 11.5–17)
EST. AVERAGE GLUCOSE BLD GHB EST-MCNC: 114 MG/DL
GLOBULIN SER-MCNC: 3.6 GM/DL (ref 2.4–3.5)
GLUCOSE SERPL-MCNC: 88 MG/DL (ref 74–106)
HBA1C MFR BLD: 5.6 % (ref 4.2–6.3)
HCT VFR BLD AUTO: 42 % (ref 42–52)
HGB BLD-MCNC: 14.1 GM/DL (ref 14–18)
IRON SATN MFR SERPL: 34.2 % (ref 20–50)
IRON SERPL-MCNC: 125 MCG/DL (ref 50–175)
LYMPHOCYTES # BLD AUTO: 2.2 X10(3)/MCL (ref 0.6–4.6)
LYMPHOCYTES NFR BLD AUTO: 44 %
MCH RBC QN AUTO: 32.7 PG (ref 27–31)
MCHC RBC AUTO-ENTMCNC: 33.6 GM/DL (ref 33–36)
MCV RBC AUTO: 97.4 FL (ref 80–94)
MONOCYTES # BLD AUTO: 0.5 X10(3)/MCL (ref 0.1–1.3)
MONOCYTES NFR BLD AUTO: 10 %
NEUTROPHILS # BLD AUTO: 2.18 X10(3)/MCL (ref 2.1–9.2)
NEUTROPHILS NFR BLD AUTO: 44 %
PLATELET # BLD AUTO: 258 X10(3)/MCL (ref 130–400)
PMV BLD AUTO: 9.8 FL (ref 9.4–12.4)
POTASSIUM SERPL-SCNC: 4 MMOL/L (ref 3.5–5.1)
PROT SERPL-MCNC: 7.7 GM/DL (ref 6.4–8.2)
RBC # BLD AUTO: 4.31 X10(6)/MCL (ref 4.7–6.1)
SODIUM SERPL-SCNC: 140 MMOL/L (ref 136–145)
TIBC SERPL-MCNC: 366 MCG/DL (ref 250–450)
TRANSFERRIN SERPL-MCNC: 281 MG/DL (ref 200–360)
VIT B12 SERPL-MCNC: 318 PG/ML (ref 193–986)
WBC # SPEC AUTO: 4.9 X10(3)/MCL (ref 4.5–11.5)

## 2019-04-29 ENCOUNTER — HISTORICAL (OUTPATIENT)
Dept: BARIATRICS | Facility: HOSPITAL | Age: 59
End: 2019-04-29

## 2019-05-31 ENCOUNTER — HISTORICAL (OUTPATIENT)
Dept: ADMINISTRATIVE | Facility: HOSPITAL | Age: 59
End: 2019-05-31

## 2019-05-31 LAB
ABS NEUT (OLG): 2.65 X10(3)/MCL (ref 2.1–9.2)
ALBUMIN SERPL-MCNC: 4.3 GM/DL (ref 3.4–5)
ALBUMIN/GLOB SERPL: 1.2 RATIO (ref 1.1–2)
ALP SERPL-CCNC: 71 UNIT/L (ref 50–136)
ALT SERPL-CCNC: 28 UNIT/L (ref 12–78)
AST SERPL-CCNC: 16 UNIT/L (ref 15–37)
BASOPHILS # BLD AUTO: 0 X10(3)/MCL (ref 0–0.2)
BASOPHILS NFR BLD AUTO: 0 %
BILIRUB SERPL-MCNC: 0.7 MG/DL (ref 0.2–1)
BILIRUBIN DIRECT+TOT PNL SERPL-MCNC: 0.2 MG/DL (ref 0–0.5)
BILIRUBIN DIRECT+TOT PNL SERPL-MCNC: 0.5 MG/DL (ref 0–0.8)
BUN SERPL-MCNC: 19 MG/DL (ref 7–18)
CALCIUM SERPL-MCNC: 10.1 MG/DL (ref 8.5–10.1)
CD3+CD4+ CELLS # SPEC: 734 UNIT/L (ref 589–1505)
CD3+CD4+ CELLS NFR BLD: 38.9 % (ref 31–59)
CHLORIDE SERPL-SCNC: 104 MMOL/L (ref 98–107)
CHOLEST SERPL-MCNC: 170 MG/DL (ref 0–200)
CHOLEST/HDLC SERPL: 3.2 {RATIO} (ref 0–5)
CO2 SERPL-SCNC: 27 MMOL/L (ref 21–32)
CREAT SERPL-MCNC: 1.16 MG/DL (ref 0.7–1.3)
EOSINOPHIL # BLD AUTO: 0 X10(3)/MCL (ref 0–0.9)
EOSINOPHIL NFR BLD AUTO: 1 %
ERYTHROCYTE [DISTWIDTH] IN BLOOD BY AUTOMATED COUNT: 12 % (ref 11.5–17)
GLOBULIN SER-MCNC: 3.5 GM/DL (ref 2.4–3.5)
GLUCOSE SERPL-MCNC: 104 MG/DL (ref 74–106)
HCT VFR BLD AUTO: 42.4 % (ref 42–52)
HDLC SERPL-MCNC: 53 MG/DL (ref 35–60)
HGB BLD-MCNC: 14.5 GM/DL (ref 14–18)
LDLC SERPL CALC-MCNC: 75 MG/DL (ref 0–129)
LYMPHOCYTES # BLD AUTO: 1.9 X10(3)/MCL (ref 0.6–4.6)
LYMPHOCYTES # BLD AUTO: 1887 UNIT/L (ref 1260–5520)
LYMPHOCYTES NFR BLD AUTO: 37 %
LYMPHOCYTES NFR LN MANUAL: 37 % (ref 28–48)
LYMPHOMA - T-CELL MARKERS SPEC-IMP: NORMAL
MCH RBC QN AUTO: 32.6 PG (ref 27–31)
MCHC RBC AUTO-ENTMCNC: 34.2 GM/DL (ref 33–36)
MCV RBC AUTO: 95.3 FL (ref 80–94)
MONOCYTES # BLD AUTO: 0.5 X10(3)/MCL (ref 0.1–1.3)
MONOCYTES NFR BLD AUTO: 9 %
NEUTROPHILS # BLD AUTO: 2.65 X10(3)/MCL (ref 2.1–9.2)
NEUTROPHILS NFR BLD AUTO: 52 %
PLATELET # BLD AUTO: 266 X10(3)/MCL (ref 130–400)
PMV BLD AUTO: 9.8 FL (ref 9.4–12.4)
POTASSIUM SERPL-SCNC: 3.8 MMOL/L (ref 3.5–5.1)
PROT SERPL-MCNC: 7.8 GM/DL (ref 6.4–8.2)
RBC # BLD AUTO: 4.45 X10(6)/MCL (ref 4.7–6.1)
SODIUM SERPL-SCNC: 139 MMOL/L (ref 136–145)
TRIGL SERPL-MCNC: 212 MG/DL (ref 30–150)
VLDLC SERPL CALC-MCNC: 42 MG/DL
WBC # BLD AUTO: 5100 /MM3 (ref 4500–11500)
WBC # SPEC AUTO: 5.1 X10(3)/MCL (ref 4.5–11.5)

## 2019-07-24 ENCOUNTER — HISTORICAL (OUTPATIENT)
Dept: BARIATRICS | Facility: HOSPITAL | Age: 59
End: 2019-07-24

## 2019-10-31 ENCOUNTER — HISTORICAL (OUTPATIENT)
Dept: ADMINISTRATIVE | Facility: HOSPITAL | Age: 59
End: 2019-10-31

## 2019-10-31 LAB
ABS NEUT (OLG): 2.37 X10(3)/MCL (ref 2.1–9.2)
ALBUMIN SERPL-MCNC: 4.2 GM/DL (ref 3.4–5)
ALBUMIN/GLOB SERPL: 1.2 RATIO (ref 1.1–2)
ALP SERPL-CCNC: 113 UNIT/L (ref 50–136)
ALT SERPL-CCNC: 16 UNIT/L (ref 12–78)
AST SERPL-CCNC: 10 UNIT/L (ref 15–37)
BASOPHILS # BLD AUTO: 0 X10(3)/MCL (ref 0–0.2)
BASOPHILS NFR BLD AUTO: 1 %
BILIRUB SERPL-MCNC: 0.9 MG/DL (ref 0.2–1)
BILIRUBIN DIRECT+TOT PNL SERPL-MCNC: 0.2 MG/DL (ref 0–0.5)
BILIRUBIN DIRECT+TOT PNL SERPL-MCNC: 0.7 MG/DL (ref 0–0.8)
BUN SERPL-MCNC: 14 MG/DL (ref 7–18)
CALCIUM SERPL-MCNC: 9.7 MG/DL (ref 8.5–10.1)
CD3+CD4+ CELLS # SPEC: 696 UNIT/L (ref 589–1505)
CD3+CD4+ CELLS NFR BLD: 42 % (ref 31–59)
CHLORIDE SERPL-SCNC: 104 MMOL/L (ref 98–107)
CHOLEST SERPL-MCNC: 219 MG/DL (ref 0–200)
CHOLEST/HDLC SERPL: 4.1 {RATIO} (ref 0–5)
CO2 SERPL-SCNC: 29 MMOL/L (ref 21–32)
CREAT SERPL-MCNC: 0.87 MG/DL (ref 0.7–1.3)
DEPRECATED CALCIDIOL+CALCIFEROL SERPL-MC: 42.91 NG/ML (ref 30–80)
EOSINOPHIL # BLD AUTO: 0.1 X10(3)/MCL (ref 0–0.9)
EOSINOPHIL NFR BLD AUTO: 2 %
ERYTHROCYTE [DISTWIDTH] IN BLOOD BY AUTOMATED COUNT: 12.2 % (ref 11.5–17)
EST. AVERAGE GLUCOSE BLD GHB EST-MCNC: 103 MG/DL
GLOBULIN SER-MCNC: 3.4 GM/DL (ref 2.4–3.5)
GLUCOSE SERPL-MCNC: 92 MG/DL (ref 74–106)
HBA1C MFR BLD: 5.2 % (ref 4.2–6.3)
HCT VFR BLD AUTO: 48.7 % (ref 42–52)
HCV AB SERPL QL IA: NEGATIVE
HDLC SERPL-MCNC: 54 MG/DL (ref 35–60)
HGB BLD-MCNC: 16.6 GM/DL (ref 14–18)
IRON SATN MFR SERPL: 44.7 % (ref 20–50)
IRON SERPL-MCNC: 152 MCG/DL (ref 50–175)
LDLC SERPL CALC-MCNC: 105 MG/DL (ref 0–129)
LYMPHOCYTES # BLD AUTO: 1.7 X10(3)/MCL (ref 0.6–4.6)
LYMPHOCYTES # BLD AUTO: 1656 UNIT/L (ref 1260–5520)
LYMPHOCYTES NFR BLD AUTO: 36 %
LYMPHOCYTES NFR LN MANUAL: 36 % (ref 28–48)
LYMPHOMA - T-CELL MARKERS SPEC-IMP: NORMAL
MCH RBC QN AUTO: 33.2 PG (ref 27–31)
MCHC RBC AUTO-ENTMCNC: 34.1 GM/DL (ref 33–36)
MCV RBC AUTO: 97.4 FL (ref 80–94)
MONOCYTES # BLD AUTO: 0.5 X10(3)/MCL (ref 0.1–1.3)
MONOCYTES NFR BLD AUTO: 10 %
NEUTROPHILS # BLD AUTO: 2.37 X10(3)/MCL (ref 2.1–9.2)
NEUTROPHILS NFR BLD AUTO: 51 %
PLATELET # BLD AUTO: 254 X10(3)/MCL (ref 130–400)
PMV BLD AUTO: 10.1 FL (ref 9.4–12.4)
POTASSIUM SERPL-SCNC: 4.1 MMOL/L (ref 3.5–5.1)
PROT SERPL-MCNC: 7.6 GM/DL (ref 6.4–8.2)
RBC # BLD AUTO: 5 X10(6)/MCL (ref 4.7–6.1)
RPR SER QL: REACTIVE
SODIUM SERPL-SCNC: 139 MMOL/L (ref 136–145)
T PALLIDUM AB SER QL: ABNORMAL
TIBC SERPL-MCNC: 340 MCG/DL (ref 250–450)
TRANSFERRIN SERPL-MCNC: 266 MG/DL (ref 200–360)
TRIGL SERPL-MCNC: 298 MG/DL (ref 30–150)
TSH SERPL-ACNC: 1.95 MIU/L (ref 0.36–3.74)
VIT B12 SERPL-MCNC: 263 PG/ML (ref 193–986)
VLDLC SERPL CALC-MCNC: 60 MG/DL
WBC # BLD AUTO: 4600 /MM3 (ref 4500–11500)
WBC # SPEC AUTO: 4.6 X10(3)/MCL (ref 4.5–11.5)

## 2019-11-01 ENCOUNTER — HISTORICAL (OUTPATIENT)
Dept: BARIATRICS | Facility: HOSPITAL | Age: 59
End: 2019-11-01

## 2020-01-29 ENCOUNTER — HISTORICAL (OUTPATIENT)
Dept: ADMINISTRATIVE | Facility: HOSPITAL | Age: 60
End: 2020-01-29

## 2020-01-29 LAB
ABS NEUT (OLG): 2.47 X10(3)/MCL (ref 2.1–9.2)
ALBUMIN SERPL-MCNC: 4.1 GM/DL (ref 3.4–5)
ALBUMIN/GLOB SERPL: 1.2 RATIO (ref 1.1–2)
ALP SERPL-CCNC: 96 UNIT/L (ref 50–136)
ALT SERPL-CCNC: 13 UNIT/L (ref 12–78)
AST SERPL-CCNC: 7 UNIT/L (ref 15–37)
BASOPHILS # BLD AUTO: 0 X10(3)/MCL (ref 0–0.2)
BASOPHILS NFR BLD AUTO: 1 %
BILIRUB SERPL-MCNC: 0.7 MG/DL (ref 0.2–1)
BILIRUBIN DIRECT+TOT PNL SERPL-MCNC: 0.2 MG/DL (ref 0–0.5)
BILIRUBIN DIRECT+TOT PNL SERPL-MCNC: 0.5 MG/DL (ref 0–0.8)
BUN SERPL-MCNC: 11 MG/DL (ref 7–18)
CALCIUM SERPL-MCNC: 9.2 MG/DL (ref 8.5–10.1)
CD3+CD4+ CELLS # SPEC: 560 UNIT/L (ref 589–1505)
CD3+CD4+ CELLS NFR BLD: 29.3 % (ref 31–59)
CHLORIDE SERPL-SCNC: 105 MMOL/L (ref 98–107)
CHOLEST SERPL-MCNC: 208 MG/DL (ref 0–200)
CHOLEST/HDLC SERPL: 3.9 {RATIO} (ref 0–5)
CO2 SERPL-SCNC: 29 MMOL/L (ref 21–32)
CREAT SERPL-MCNC: 0.86 MG/DL (ref 0.7–1.3)
EOSINOPHIL # BLD AUTO: 0.1 X10(3)/MCL (ref 0–0.9)
EOSINOPHIL NFR BLD AUTO: 1 %
ERYTHROCYTE [DISTWIDTH] IN BLOOD BY AUTOMATED COUNT: 11.9 % (ref 11.5–17)
GLOBULIN SER-MCNC: 3.3 GM/DL (ref 2.4–3.5)
GLUCOSE SERPL-MCNC: 101 MG/DL (ref 74–106)
HCT VFR BLD AUTO: 47 % (ref 42–52)
HDLC SERPL-MCNC: 53 MG/DL (ref 35–60)
HGB BLD-MCNC: 15.8 GM/DL (ref 14–18)
LDLC SERPL CALC-MCNC: 102 MG/DL (ref 0–129)
LYMPHOCYTES # BLD AUTO: 1.9 X10(3)/MCL (ref 0.6–4.6)
LYMPHOCYTES # BLD AUTO: 1911 UNIT/L (ref 1260–5520)
LYMPHOCYTES NFR BLD AUTO: 39 %
LYMPHOCYTES NFR LN MANUAL: 39 % (ref 28–48)
LYMPHOMA - T-CELL MARKERS SPEC-IMP: ABNORMAL
MCH RBC QN AUTO: 32.9 PG (ref 27–31)
MCHC RBC AUTO-ENTMCNC: 33.6 GM/DL (ref 33–36)
MCV RBC AUTO: 97.9 FL (ref 80–94)
MONOCYTES # BLD AUTO: 0.4 X10(3)/MCL (ref 0.1–1.3)
MONOCYTES NFR BLD AUTO: 8 %
NEG CONT SPOT COUNT: NORMAL
NEUTROPHILS # BLD AUTO: 2.47 X10(3)/MCL (ref 2.1–9.2)
NEUTROPHILS NFR BLD AUTO: 51 %
PANEL A SPOT COUNT: 0
PANEL B SPOT COUNT: 0
PLATELET # BLD AUTO: 255 X10(3)/MCL (ref 130–400)
PMV BLD AUTO: 9.3 FL (ref 9.4–12.4)
POS CONT SPOT COUNT: NORMAL
POTASSIUM SERPL-SCNC: 4.1 MMOL/L (ref 3.5–5.1)
PROT SERPL-MCNC: 7.4 GM/DL (ref 6.4–8.2)
RBC # BLD AUTO: 4.8 X10(6)/MCL (ref 4.7–6.1)
RPR SER QL: NORMAL
SODIUM SERPL-SCNC: 143 MMOL/L (ref 136–145)
T PALLIDUM AB SER QL: ABNORMAL
T-SPOT.TB: NORMAL
TRIGL SERPL-MCNC: 264 MG/DL (ref 30–150)
VLDLC SERPL CALC-MCNC: 53 MG/DL
WBC # BLD AUTO: 4900 /MM3 (ref 4500–11500)
WBC # SPEC AUTO: 4.9 X10(3)/MCL (ref 4.5–11.5)

## 2020-05-22 ENCOUNTER — HISTORICAL (OUTPATIENT)
Dept: ADMINISTRATIVE | Facility: HOSPITAL | Age: 60
End: 2020-05-22

## 2020-05-22 LAB
ABS NEUT (OLG): 2.47 X10(3)/MCL (ref 2.1–9.2)
ALBUMIN SERPL-MCNC: 4.3 GM/DL (ref 3.5–5)
ALBUMIN/GLOB SERPL: 1.5 RATIO (ref 1.1–2)
ALP SERPL-CCNC: 96 UNIT/L (ref 40–150)
ALT SERPL-CCNC: 13 UNIT/L (ref 0–55)
AST SERPL-CCNC: 14 UNIT/L (ref 5–34)
BASOPHILS # BLD AUTO: 0 X10(3)/MCL (ref 0–0.2)
BASOPHILS NFR BLD AUTO: 0 %
BILIRUB SERPL-MCNC: 1.1 MG/DL
BILIRUBIN DIRECT+TOT PNL SERPL-MCNC: 0.4 MG/DL (ref 0–0.5)
BILIRUBIN DIRECT+TOT PNL SERPL-MCNC: 0.7 MG/DL (ref 0–0.8)
BUN SERPL-MCNC: 12.4 MG/DL (ref 8.4–25.7)
CALCIUM SERPL-MCNC: 9.5 MG/DL (ref 8.4–10.2)
CD3+CD4+ CELLS # SPEC: 563 UNIT/L (ref 589–1505)
CD3+CD4+ CELLS NFR BLD: 37.1 % (ref 31–59)
CHLORIDE SERPL-SCNC: 101 MMOL/L (ref 98–107)
CO2 SERPL-SCNC: 27 MMOL/L (ref 22–29)
CREAT SERPL-MCNC: 0.96 MG/DL (ref 0.73–1.18)
EOSINOPHIL # BLD AUTO: 0.1 X10(3)/MCL (ref 0–0.9)
EOSINOPHIL NFR BLD AUTO: 2 %
ERYTHROCYTE [DISTWIDTH] IN BLOOD BY AUTOMATED COUNT: 12 % (ref 11.5–17)
GLOBULIN SER-MCNC: 2.9 GM/DL (ref 2.4–3.5)
GLUCOSE SERPL-MCNC: 98 MG/DL (ref 74–100)
HCT VFR BLD AUTO: 45.6 % (ref 42–52)
HGB BLD-MCNC: 15.6 GM/DL (ref 14–18)
LYMPHOCYTES # BLD AUTO: 1.5 X10(3)/MCL (ref 0.6–4.6)
LYMPHOCYTES # BLD AUTO: 1518 UNIT/L (ref 1260–5520)
LYMPHOCYTES NFR BLD AUTO: 33 %
LYMPHOCYTES NFR LN MANUAL: 33 % (ref 28–48)
LYMPHOMA - T-CELL MARKERS SPEC-IMP: ABNORMAL
MCH RBC QN AUTO: 33.4 PG (ref 27–31)
MCHC RBC AUTO-ENTMCNC: 34.2 GM/DL (ref 33–36)
MCV RBC AUTO: 97.6 FL (ref 80–94)
MONOCYTES # BLD AUTO: 0.5 X10(3)/MCL (ref 0.1–1.3)
MONOCYTES NFR BLD AUTO: 10 %
NEUTROPHILS # BLD AUTO: 2.47 X10(3)/MCL (ref 2.1–9.2)
NEUTROPHILS NFR BLD AUTO: 54 %
PLATELET # BLD AUTO: 241 X10(3)/MCL (ref 130–400)
PMV BLD AUTO: 9.5 FL (ref 9.4–12.4)
POTASSIUM SERPL-SCNC: 4.2 MMOL/L (ref 3.5–5.1)
PROT SERPL-MCNC: 7.2 GM/DL (ref 6.4–8.3)
RBC # BLD AUTO: 4.67 X10(6)/MCL (ref 4.7–6.1)
RPR SER QL: REACTIVE
SODIUM SERPL-SCNC: 137 MMOL/L (ref 136–145)
T PALLIDUM AB SER QL: REACTIVE
WBC # BLD AUTO: 4600 /MM3 (ref 4500–11500)
WBC # SPEC AUTO: 4.6 X10(3)/MCL (ref 4.5–11.5)

## 2020-08-14 ENCOUNTER — HISTORICAL (OUTPATIENT)
Dept: ADMINISTRATIVE | Facility: HOSPITAL | Age: 60
End: 2020-08-14

## 2020-08-14 LAB
ABS NEUT (OLG): 2.25 X10(3)/MCL (ref 2.1–9.2)
ALBUMIN SERPL-MCNC: 4.2 GM/DL (ref 3.5–5)
ALBUMIN/GLOB SERPL: 1.5 RATIO (ref 1.1–2)
ALP SERPL-CCNC: 86 UNIT/L (ref 40–150)
ALT SERPL-CCNC: 12 UNIT/L (ref 0–55)
AST SERPL-CCNC: 14 UNIT/L (ref 5–34)
BASOPHILS # BLD AUTO: 0 X10(3)/MCL (ref 0–0.2)
BASOPHILS NFR BLD AUTO: 1 %
BILIRUB SERPL-MCNC: 1 MG/DL
BILIRUBIN DIRECT+TOT PNL SERPL-MCNC: 0.3 MG/DL (ref 0–0.5)
BILIRUBIN DIRECT+TOT PNL SERPL-MCNC: 0.7 MG/DL (ref 0–0.8)
BUN SERPL-MCNC: 13.4 MG/DL (ref 8.4–25.7)
CALCIUM SERPL-MCNC: 9.1 MG/DL (ref 8.4–10.2)
CD3+CD4+ CELLS # SPEC: 557 UNIT/L (ref 589–1505)
CD3+CD4+ CELLS NFR BLD: 40.2 % (ref 31–59)
CHLORIDE SERPL-SCNC: 104 MMOL/L (ref 98–107)
CO2 SERPL-SCNC: 25 MMOL/L (ref 22–29)
CREAT SERPL-MCNC: 0.81 MG/DL (ref 0.73–1.18)
EOSINOPHIL # BLD AUTO: 0 X10(3)/MCL (ref 0–0.9)
EOSINOPHIL NFR BLD AUTO: 1 %
ERYTHROCYTE [DISTWIDTH] IN BLOOD BY AUTOMATED COUNT: 11.9 % (ref 11.5–17)
GLOBULIN SER-MCNC: 2.8 GM/DL (ref 2.4–3.5)
GLUCOSE SERPL-MCNC: 88 MG/DL (ref 74–100)
HCT VFR BLD AUTO: 46.5 % (ref 42–52)
HGB BLD-MCNC: 16 GM/DL (ref 14–18)
LYMPHOCYTES # BLD AUTO: 1.4 X10(3)/MCL (ref 0.6–4.6)
LYMPHOCYTES # BLD AUTO: 1386 UNIT/L (ref 1260–5520)
LYMPHOCYTES NFR BLD AUTO: 33 %
LYMPHOCYTES NFR LN MANUAL: 33 % (ref 28–48)
LYMPHOMA - T-CELL MARKERS SPEC-IMP: ABNORMAL
MCH RBC QN AUTO: 33.3 PG (ref 27–31)
MCHC RBC AUTO-ENTMCNC: 34.4 GM/DL (ref 33–36)
MCV RBC AUTO: 96.7 FL (ref 80–94)
MONOCYTES # BLD AUTO: 0.4 X10(3)/MCL (ref 0.1–1.3)
MONOCYTES NFR BLD AUTO: 10 %
NEUTROPHILS # BLD AUTO: 2.25 X10(3)/MCL (ref 2.1–9.2)
NEUTROPHILS NFR BLD AUTO: 54 %
PLATELET # BLD AUTO: 237 X10(3)/MCL (ref 130–400)
PMV BLD AUTO: 9.9 FL (ref 9.4–12.4)
POTASSIUM SERPL-SCNC: 3.9 MMOL/L (ref 3.5–5.1)
PROT SERPL-MCNC: 7 GM/DL (ref 6.4–8.3)
RBC # BLD AUTO: 4.81 X10(6)/MCL (ref 4.7–6.1)
RPR SER QL: REACTIVE
SODIUM SERPL-SCNC: 140 MMOL/L (ref 136–145)
T PALLIDUM AB SER QL: REACTIVE
WBC # BLD AUTO: 4200 /MM3 (ref 4500–11500)
WBC # SPEC AUTO: 4.2 X10(3)/MCL (ref 4.5–11.5)

## 2021-02-26 ENCOUNTER — HISTORICAL (OUTPATIENT)
Dept: ADMINISTRATIVE | Facility: HOSPITAL | Age: 61
End: 2021-02-26

## 2021-02-26 LAB
ABS NEUT (OLG): 2.45 X10(3)/MCL (ref 2.1–9.2)
ALBUMIN SERPL-MCNC: 4.4 GM/DL (ref 3.5–5)
ALBUMIN/GLOB SERPL: 1.5 RATIO (ref 1.1–2)
ALP SERPL-CCNC: 84 UNIT/L (ref 40–150)
ALT SERPL-CCNC: 23 UNIT/L (ref 0–55)
APPEARANCE, UA: NORMAL
AST SERPL-CCNC: 17 UNIT/L (ref 5–34)
BACTERIA SPEC CULT: NORMAL /HPF
BASOPHILS # BLD AUTO: 0 X10(3)/MCL (ref 0–0.2)
BASOPHILS NFR BLD AUTO: 1 %
BILIRUB SERPL-MCNC: 1.1 MG/DL
BILIRUB UR QL STRIP: NEGATIVE
BILIRUBIN DIRECT+TOT PNL SERPL-MCNC: 0.3 MG/DL (ref 0–0.5)
BILIRUBIN DIRECT+TOT PNL SERPL-MCNC: 0.8 MG/DL (ref 0–0.8)
BUN SERPL-MCNC: 15.7 MG/DL (ref 8.4–25.7)
CALCIUM SERPL-MCNC: 9.5 MG/DL (ref 8.4–10.2)
CD3+CD4+ CELLS # SPEC: 560 UNIT/L (ref 589–1505)
CD3+CD4+ CELLS NFR BLD: 39.8 % (ref 31–59)
CHLORIDE SERPL-SCNC: 104 MMOL/L (ref 98–107)
CHOLEST SERPL-MCNC: 194 MG/DL
CHOLEST/HDLC SERPL: 4 {RATIO} (ref 0–5)
CO2 SERPL-SCNC: 26 MMOL/L (ref 22–29)
COLOR UR: NORMAL
CREAT SERPL-MCNC: 0.9 MG/DL (ref 0.73–1.18)
DEPRECATED CALCIDIOL+CALCIFEROL SERPL-MC: 15.5 NG/ML (ref 30–80)
EOSINOPHIL # BLD AUTO: 0.1 X10(3)/MCL (ref 0–0.9)
EOSINOPHIL NFR BLD AUTO: 2 %
ERYTHROCYTE [DISTWIDTH] IN BLOOD BY AUTOMATED COUNT: 12.2 % (ref 11.5–17)
EST. AVERAGE GLUCOSE BLD GHB EST-MCNC: 105.4 MG/DL
GLOBULIN SER-MCNC: 2.9 GM/DL (ref 2.4–3.5)
GLUCOSE (UA): NEGATIVE
GLUCOSE SERPL-MCNC: 102 MG/DL (ref 74–100)
HBA1C MFR BLD: 5.3 %
HCT VFR BLD AUTO: 47.6 % (ref 42–52)
HCV AB SERPL QL IA: NONREACTIVE
HDLC SERPL-MCNC: 46 MG/DL (ref 35–60)
HGB BLD-MCNC: 16.3 GM/DL (ref 14–18)
HGB UR QL STRIP: NEGATIVE
KETONES UR QL STRIP: NEGATIVE
LDLC SERPL CALC-MCNC: 106 MG/DL (ref 50–140)
LEUKOCYTE ESTERASE UR QL STRIP: NEGATIVE
LYMPHOCYTES # BLD AUTO: 1.4 X10(3)/MCL (ref 0.6–4.6)
LYMPHOCYTES # BLD AUTO: 1408 UNIT/L (ref 1260–5520)
LYMPHOCYTES NFR BLD AUTO: 32 %
LYMPHOCYTES NFR LN MANUAL: 32 % (ref 28–48)
LYMPHOMA - T-CELL MARKERS SPEC-IMP: ABNORMAL
MCH RBC QN AUTO: 32.6 PG (ref 27–31)
MCHC RBC AUTO-ENTMCNC: 34.2 GM/DL (ref 33–36)
MCV RBC AUTO: 95.2 FL (ref 80–94)
MONOCYTES # BLD AUTO: 0.4 X10(3)/MCL (ref 0.1–1.3)
MONOCYTES NFR BLD AUTO: 9 %
NEG CONT SPOT COUNT: NORMAL
NEUTROPHILS # BLD AUTO: 2.45 X10(3)/MCL (ref 2.1–9.2)
NEUTROPHILS NFR BLD AUTO: 56 %
NITRITE UR QL STRIP: NEGATIVE
PANEL A SPOT COUNT: 0
PANEL B SPOT COUNT: 0
PH UR STRIP: 5 [PH] (ref 5–9)
PLATELET # BLD AUTO: 266 X10(3)/MCL (ref 130–400)
PMV BLD AUTO: 9.4 FL (ref 9.4–12.4)
POS CONT SPOT COUNT: NORMAL
POTASSIUM SERPL-SCNC: 4.3 MMOL/L (ref 3.5–5.1)
PROT SERPL-MCNC: 7.3 GM/DL (ref 6.4–8.3)
PROT UR QL STRIP: NEGATIVE
RBC # BLD AUTO: 5 X10(6)/MCL (ref 4.7–6.1)
RBC #/AREA URNS HPF: NORMAL /[HPF]
RPR SER QL: REACTIVE
SODIUM SERPL-SCNC: 139 MMOL/L (ref 136–145)
SP GR UR STRIP: 1.03 (ref 1–1.03)
SQUAMOUS EPITHELIAL, UA: NORMAL
T PALLIDUM AB SER QL: REACTIVE
T-SPOT.TB: NORMAL
TRIGL SERPL-MCNC: 210 MG/DL (ref 34–140)
TSH SERPL-ACNC: 1.19 UIU/ML (ref 0.35–4.94)
UROBILINOGEN UR STRIP-ACNC: 0.2
VLDLC SERPL CALC-MCNC: 42 MG/DL
WBC # BLD AUTO: 4400 /MM3 (ref 4500–11500)
WBC # SPEC AUTO: 4.4 X10(3)/MCL (ref 4.5–11.5)
WBC #/AREA URNS HPF: NORMAL /HPF

## 2021-08-11 ENCOUNTER — HISTORICAL (OUTPATIENT)
Dept: ADMINISTRATIVE | Facility: HOSPITAL | Age: 61
End: 2021-08-11

## 2022-02-14 ENCOUNTER — HISTORICAL (OUTPATIENT)
Dept: ADMINISTRATIVE | Facility: HOSPITAL | Age: 62
End: 2022-02-14

## 2022-02-14 LAB
ABS NEUT (OLG): 2.61 (ref 2.1–9.2)
ALBUMIN SERPL-MCNC: 4.5 G/DL (ref 3.4–4.8)
ALBUMIN/GLOB SERPL: 1.4 {RATIO} (ref 1.1–2)
ALP SERPL-CCNC: 81 U/L (ref 40–150)
ALT SERPL-CCNC: 51 U/L (ref 0–55)
APPEARANCE, UA: CLEAR
AST SERPL-CCNC: 34 U/L (ref 5–34)
BACTERIA SPEC CULT: NORMAL
BASOPHILS # BLD AUTO: 0 10*3/UL (ref 0–0.2)
BASOPHILS NFR BLD AUTO: 1 %
BILIRUB SERPL-MCNC: 1.2 MG/DL
BILIRUB UR QL STRIP: NEGATIVE
BILIRUBIN DIRECT+TOT PNL SERPL-MCNC: 0.4 (ref 0–0.5)
BILIRUBIN DIRECT+TOT PNL SERPL-MCNC: 0.8 (ref 0–0.8)
BUDDING YEAST: NORMAL
BUN SERPL-MCNC: 13.2 MG/DL (ref 8.4–25.7)
CALCIUM SERPL-MCNC: 10.3 MG/DL (ref 8.7–10.5)
CASTS, UA: 14
CHLORIDE SERPL-SCNC: 101 MMOL/L (ref 98–107)
CHOLEST SERPL-MCNC: 213 MG/DL
CHOLEST/HDLC SERPL: 4 {RATIO} (ref 0–5)
CO2 SERPL-SCNC: 24 MMOL/L (ref 23–31)
COLOR UR: NORMAL
CREAT SERPL-MCNC: 0.9 MG/DL (ref 0.73–1.18)
CRYSTALS: NORMAL
DEPRECATED CALCIDIOL+CALCIFEROL SERPL-MC: 16.5 NG/ML (ref 30–80)
EOSINOPHIL # BLD AUTO: 0.1 10*3/UL (ref 0–0.9)
EOSINOPHIL NFR BLD AUTO: 2 %
ERYTHROCYTE [DISTWIDTH] IN BLOOD BY AUTOMATED COUNT: 12.6 % (ref 11.5–17)
EST. AVERAGE GLUCOSE BLD GHB EST-MCNC: 116.9 MG/DL
GLOBULIN SER-MCNC: 3.3 G/DL (ref 2.4–3.5)
GLUCOSE (UA): NEGATIVE
GLUCOSE SERPL-MCNC: 102 MG/DL (ref 82–115)
HBA1C MFR BLD: 5.7 %
HCT VFR BLD AUTO: 46.7 % (ref 42–52)
HCV AB SERPL QL IA: 0.17
HCV AB SERPL QL IA: NONREACTIVE
HDLC SERPL-MCNC: 54 MG/DL (ref 35–60)
HEMOLYSIS INTERF INDEX SERPL-ACNC: 6
HGB BLD-MCNC: 16.2 G/DL (ref 14–18)
HGB UR QL STRIP: NEGATIVE
ICTERIC INTERF INDEX SERPL-ACNC: 1
KETONES UR QL STRIP: NEGATIVE
LDLC SERPL CALC-MCNC: 112 MG/DL (ref 50–140)
LEUKOCYTE ESTERASE UR QL STRIP: NEGATIVE
LIPEMIC INTERF INDEX SERPL-ACNC: 5
LYMPHOCYTES # BLD AUTO: 1.5 10*3/UL (ref 0.6–4.6)
LYMPHOCYTES NFR BLD AUTO: 32 %
MANUAL DIFF? (OHS): NO
MCH RBC QN AUTO: 32.7 PG (ref 27–31)
MCHC RBC AUTO-ENTMCNC: 34.7 G/DL (ref 33–36)
MCV RBC AUTO: 94.2 FL (ref 80–94)
MONOCYTES # BLD AUTO: 0.4 10*3/UL (ref 0.1–1.3)
MONOCYTES NFR BLD AUTO: 9 %
NEUTROPHILS # BLD AUTO: 2.61 10*3/UL (ref 2.1–9.2)
NEUTROPHILS NFR BLD AUTO: 56 %
NITRITE UR QL STRIP: NEGATIVE
PH UR STRIP: 5 [PH] (ref 5–9)
PLATELET # BLD AUTO: 256 10*3/UL (ref 130–400)
PMV BLD AUTO: 9.5 FL (ref 9.4–12.4)
POTASSIUM SERPL-SCNC: 4.1 MMOL/L (ref 3.5–5.1)
PROT SERPL-MCNC: 7.8 G/DL (ref 5.8–7.6)
PROT UR QL STRIP: NEGATIVE
RBC # BLD AUTO: 4.96 10*6/UL (ref 4.7–6.1)
RBC #/AREA URNS HPF: NORMAL /[HPF] (ref 0–2)
SMALL ROUND CELLS, UA: NORMAL
SODIUM SERPL-SCNC: 135 MMOL/L (ref 136–145)
SP GR UR STRIP: 1.04 (ref 1–1.03)
SPERM URNS QL MICRO: NORMAL
SQUAMOUS EPITHELIAL, UA: NORMAL (ref 0–4)
T PALLIDUM AB SER QL: REACTIVE
TRIGL SERPL-MCNC: 235 MG/DL (ref 34–140)
TSH SERPL-ACNC: 2.59 M[IU]/L (ref 0.35–4.94)
UROBILINOGEN UR STRIP-ACNC: 1
VLDLC SERPL CALC-MCNC: 47 MG/DL
WBC # SPEC AUTO: 4.6 10*3/UL (ref 4.5–11.5)
WBC #/AREA URNS HPF: 7 /[HPF] (ref 0–3)

## 2022-02-15 LAB
AGE: NORMAL
CD3+CD4+ CELLS # SPEC: 615 /UL (ref 589–1505)
CD3+CD4+ CELLS NFR BLD: 41.8 % (ref 31–59)
LYMPHOCYTES # BLD AUTO: 1472 10*3/UL (ref 1260–5520)
LYMPHOCYTES NFR LN MANUAL: 32 % (ref 28–48)
LYMPHOMA - T-CELL MARKERS SPEC-IMP: NORMAL
WBC # BLD AUTO: 4600 10*3/UL (ref 4500–11500)

## 2022-03-04 LAB
NEG CONT SPOT COUNT: NORMAL
PANEL A SPOT COUNT: 0
PANEL B SPOT COUNT: 0
POS CONT SPOT COUNT: NORMAL
T-SPOT.TB: NORMAL

## 2022-04-10 ENCOUNTER — HISTORICAL (OUTPATIENT)
Dept: ADMINISTRATIVE | Facility: HOSPITAL | Age: 62
End: 2022-04-10
Payer: MEDICARE

## 2022-04-28 VITALS
BODY MASS INDEX: 37.72 KG/M2 | HEIGHT: 68 IN | OXYGEN SATURATION: 97 % | SYSTOLIC BLOOD PRESSURE: 136 MMHG | DIASTOLIC BLOOD PRESSURE: 78 MMHG | WEIGHT: 248.88 LBS

## 2022-04-30 NOTE — PROGRESS NOTES
[facility]  Weight Loss Surgery Department  [address]  [place], [place]  [phone] Office  [phone] Fax    PreOp: Medically Supervised Weight Loss Counseling    Name: Yoan Powell : 62   MRN: [id number]    Date: 18  Visit #:      Todays Wt: 285.1#  Previous Wt: 291#   Initial Wt: 289.5#        Notes from Dietitian/Nutritionist Visit:  Pt state he has been working to reduce portions, but is not accurately measuring, simply eyeballing portions.  Pt states he has eliminated soft drinks and is drinking only water throughout the day.  He reports less snacking at night.     Pt was advised to choose 1 meal per day without a starchy CHO or starchy vegetable and to routinely incorporate breakfast.  Pt was educated on which vegetables are considered starchy.  In addition, pt was advised on suitable protein supplements to begin trying in light of upcoming surgery.  Pt expressed understanding.     Plan:   Continue above changes  x Follow up in approx 1 month   Pt has completed medically supervised wt loss program x  mths  x Goals:   1. Reduce nighttime snacking.  2. 20 minute walk daily.    3. Prevent meal skipping.  4. Refer to dining out guide when away from home.  5. Eat according to meal plan, measuring all portions as listed.    Estimated Daily Needs:  1740-2175_Kcals/d 20-25 kcals/kg goal wt  87-96 g protein/d 1.0-1.1 g/kg goal wt  100 fl oz/d  73 fl oz/d female     100 fl oz/d male        Signature of Registered Dietitian/Nutritionist: Soledad Kenny, MERN, LDN

## 2022-04-30 NOTE — PROGRESS NOTES
[facility]  Weight Loss Surgery Department  [address]  [address]  [place], [place]  [phone] Office  [phone] Fax    PreOp: Medically Supervised Weight Loss Counseling    Name: Yoan Powell : 62   MRN: [id number]    Date: 18  Visit #: 3/6     Todays Wt: 286.8#  Previous Wt: 287.5#   Initial Wt: 289.5#        Notes from Dietitian/Nutritionist Visit:  Pt states he has not been able to start walking and continues to struggle with nighttime eating.  Pt also reports more restaurant eating due to caring for ill father.  Pt states he is restricting his intake of starchy CHOs, but his 24 hr recall indicates otherwise (Chinese, pizza).  Pt also admits to some meal skipping.      Pt was advised against meal skipping in order to properly prepare for surgery.  In addition, pt was educated on improved dining out choices and given a dining out guide to better direct his food choices when traveling and caring for father.  Lastly, pt was advised to incorporate 20 mins of walking into day, even if it is 2 sessions of 10 mins.  Pt expressed understanding.    Plan:   Continue above changes  x Follow up in approx 1 month   Pt has completed medically supervised wt loss program x  mths  x Goals:   1. Reduce nighttime snacking.  2. 20 minute walk daily.    3. Prevent meal skipping.  4. Refer to dining out guide when away from home.    Estimated Daily Needs:  1740-2175_Kcals/d 20-25 kcals/kg goal wt  87-96 g protein/d 1.0-1.1 g/kg goal wt  100 fl oz/d  73 fl oz/d female     100 fl oz/d male        Signature of Registered Dietitian/Nutritionist: Soledad Kenny, MERN, LDN

## 2022-04-30 NOTE — PROGRESS NOTES
[facility]  Weight Loss Surgery Department  [address]  [address]  [place], [place]  [phone] Office  [phone] Fax    PreOp: Medically Supervised Weight Loss Counseling    Name: Yoan Powell : 62   MRN: [id number]    Date: 18  Visit #:      Todays Wt: 291#  Previous Wt: 286.8#   Initial Wt: 289.5#        Notes from Dietitian/Nutritionist Visit:  Pt states he has not been able to start walking and struggled with emotional eating as a result of his stepfathers death recently.  He states he has been eating foods brought to him from family and friends since the . He reports grazing behaviors, eating every 2 hours.  He states he has continued to avoid soft drinks, replacing these with flavored water and sweet tea.      Pt was advised to begin making lifestyle changes now in order to improve weight loss success following surgery.  Pt was educated on a modified pre-op diet and advised to eat every 4 hours as dictated on meal plan.  In addition, pt was advised to eliminate sweet tea and to select only sugar free beverages.  Pt was also advised to incorporate more physical activity daily.  Pt voiced understanding.     Plan:   Continue above changes  x Follow up in approx 1 month   Pt has completed medically supervised wt loss program x  mths  x Goals:   1. Reduce nighttime snacking.  2. 20 minute walk daily.    3. Prevent meal skipping.  4. Refer to dining out guide when away from home.  5. Eat according to meal plan, measuring all portions as listed.    Estimated Daily Needs:  1740-2175_Kcals/d 20-25 kcals/kg goal wt  87-96 g protein/d 1.0-1.1 g/kg goal wt  100 fl oz/d  73 fl oz/d female     100 fl oz/d male        Signature of Registered Dietitian/Nutritionist: Soledad Kenny, KIRAN, LDN

## 2022-04-30 NOTE — PROGRESS NOTES
[facility]  Weight Loss Surgery Department  [address]  [address]  [place], [place]  [phone] Office  [phone] Fax    PreOp: Medically Supervised Weight Loss Counseling    Name: Yoan Powell : 62   MRN: [id number]    Date: 3/29/18  Visit #:      Todays Wt: 287.5#  Previous Wt: 289.5#   Initial Wt: 289.5#        Notes from Dietitian/Nutritionist Visit:  Pt states that he has been able to eliminate all soft drinks since last months visit.  He is choosing to drink water with and without flavoring.  He is focusing his meal intake on protein (eggs, chicken) and vegetables.  Little processed, starchy CHOs are noted in recall.  He admits to snacks in the middle of the night to assist him in going back to sleep.  He states this occurs every night.      Pt was advised to begin reducing the frequency of nighttime eating.  In addition, pt was advised to begin walking on his treadmill for 20 mins/d as he agreed to.  Pt voiced understanding.    Plan:  x Continue above changes  x Follow up in approx 1 month   Pt has completed medically supervised wt loss program x  mths  x Goals:   1. Reduce nighttime snacking.  2. 20 minute walk daily.      Estimated Daily Needs:  1740-2175_Kcals/d 20-25 kcals/kg goal wt  87-96 g protein/d 1.0-1.1 g/kg goal wt  100 fl oz/d  73 fl oz/d female     100 fl oz/d male        Signature of Registered Dietitian/Nutritionist: Soledad Kenny, KIRAN, LDN

## 2022-04-30 NOTE — OP NOTE
DATE OF SURGERY:    07/26/2018    SURGEON:  Jimmie Stewart MD    PROCEDURE PERFORMED:  Esophagogastroduodenoscopy.    INDICATIONS:  This is a 57-year-old male who is in the process of being evaluated for bariatric surgery for weight loss and treatment of his morbid obesity and medical comorbidities.  He has heartburn on occasion and this is just part of his preoperative workup to rule out hiatal hernia and feasibility for surgery.    PREOPERATIVE DIAGNOSES:    1. Morbid obesity.  2. Gastroesophageal reflux disease.  3. Human immunodeficiency virus.  4. Diabetes.    POSTOPERATIVE DIAGNOSES:    1. Morbid obesity.  2. Gastroesophageal reflux disease.  3. Human immunodeficiency virus.  4. Diabetes.    ANESTHESIA:  Monitored.    SPECIMENS REMOVED:  None.    BLOOD LOSS:  None.    PROCEDURE IN DETAIL:  The patient was brought to the operating room.  He was laid on his left side and monitored anesthesia was administered, sedating patient.  Once he was adequately sedated, a bite block was inserted and we advanced the scope through his mouth, down his esophagus, into his stomach and duodenum.  The duodenal mucosa appeared normal.  The esophageal mucosa also appeared normal.  The gastric mucosa, there was mild gastritis and there were no ulcers or masses seen; however, he did have a moderate amount of vegetable matter within the stomach, which was moved aside to ensure that there was no underlying ulcer underneath.  No biopsies were taken.      FINDINGS:  Normal duodenal and esophageal mucosa.  Mild gastritis.  No masses.  No hiatal hernia.        ______________________________  Jimmie Stewart MD RA/CLEMENTE  DD:  07/26/2018  Time:  08:42AM  DT:  07/26/2018  Time:  08:52AM  Job #:  [id number]

## 2022-05-04 NOTE — HISTORICAL [facility] CERNER
This is a historical note converted from [place]. Formatting and pictures may have been removed.  Please reference Jeremy for original formatting and attached multimedia. Chief Complaint  RTC med refills, lab results  History of Present Illness  55 yo WM presents for HIV f/u visit.? Reports 100% adherent to Genvoya, hugo well.? BP at goal.? Has met with dietician, Viktoria Alvarado.? Has revised diet, but not incorporated exercise yet.? Meeting with her again later this week.? Taking Crestor as rxd.? Did not get call to schedule DEXA, needs to be re-ordered.? Due for repeat colonoscopy this summer, has not scheduled yet due to family member recently dx with pancreatic cancer.? Will schedule soon.? Declines opth referral at this time, would prefer screenings every 2 yrs, no c/o, no family hx of eye disease.  Review of Systems  ?  ?  Constitutional: negative except as stated in HPI  Eye: negative except as stated in HPI  ENMT: negative except as stated in HPI  Respiratory: negative except as stated in HPI  Cardiovascular: negative except as stated in HPI  Gastrointestinal: negative except as stated in HPI  Genitourinary: negative except as stated in HPI  Hema/Lymph: negative except as stated in HPI  Endocrine: negative except as stated in HPI  Immunologic: negative except as stated in HPI  Musculoskeletal: negative except as stated in HPI  Integumentary: negative except as stated in HPI  Neurologic: negative except as stated in HPI  ?   All Other ROS_ ?negative except as stated in HPI  Physical Exam  Vitals & Measurements  T:?36.9? ?C ?(Oral)? HR:?96?(Peripheral)? BP:?123/85? HT:?177?cm? HT:?177?cm? WT:?124.6?kg? WT:?124.6?kg? BMI:?39.77?  ?  ?  General: AAO X 4, afebrile  Eye: no icterus  HENT: oropharynx clear  Neck: supple  Respiratory: BBS CTA, non-labored, symmetrical  Cardiovascular: S1S2, RRR  Gastrointestinal BS + 4 quadrants, NTND, soft, no organomegaly  Genitourinary: non-tender  Lymphatics: no  lymphadenopathy  Musculoskeletal: MAEW, steady gait  Integumentary: WDI  Neurologic: CN II-XII intact  Assessment/Plan  1.?Human immunodeficiency virus infection  ?adherence/sexual health counseling done  cont genvoya 1 po daily  rtc?3 mos w allen,fasting labs 1 wk prior  Ordered:  cobicistat/elvitegravir/emtricitabine/tenofov, 1 tab(s), Oral, Daily, # 30 tab(s), 5 Refill(s), Pharmacy: [facility]  Clinic Follow up, *Est. 09/21/17 3:00:00 CDT, Order for future visit, Human immunodeficiency virus infection, [facility]  Office/Outpatient Visit Level 5 Established 04850 PC, Human immunodeficiency virus infection  HTN (hypertension)(  Confirmed  )  HLD (hyperlipidemia)  Vitamin D deficiency(  Confirmed  )  Osteopenia, [facility], 06/21/17 7:57:00 CDT  ?  2.?HTN (hypertension)(  Confirmed  )  ?at goal  Ordered:  hydrochlorothiazide-lisinopril, 1 tab(s), Oral, Daily, # 30 tab(s), 5 Refill(s), Pharmacy: [facility]  Office/Outpatient Visit Level 5 Established 92032 PC, Human immunodeficiency virus infection  HTN (hypertension)(  Confirmed  )  HLD (hyperlipidemia)  Vitamin D deficiency(  Confirmed  )  Osteopenia, [facility], 06/21/17 7:57:00 CDT  ?  3.?HLD (hyperlipidemia)  ?repeat lipids next visit  keep f/u with Viktoria? Jenny  cont crestor  Ordered:  rosuvastatin, 10 mg = 1 tab(s), Oral, Daily, # 30 tab(s), 5 Refill(s), Pharmacy: [facility]  Office/Outpatient Visit Level 5 Established 24043 PC, Human immunodeficiency virus infection  HTN (hypertension)(  Confirmed  )  HLD (hyperlipidemia)  Vitamin D deficiency(  Confirmed  )  Osteopenia, [facility], 06/21/17 7:57:00 CDT  ?  4.?Vitamin D deficiency(  Confirmed  )  ?at goal  cont replacement  Ordered:  ergocalciferol, 50,000 IntUnit = 1 cap(s), Oral, qWeek, # 5 cap(s), 5 Refill(s), Pharmacy: [facility]  Office/Outpatient Visit Level 5 Established 73670 PC, Human immunodeficiency virus infection   HTN (hypertension)(  Confirmed  )  HLD (hyperlipidemia)  Vitamin D deficiency(  Confirmed  )  Osteopenia, [facility], 06/21/17 7:57:00 CDT  ?  5.?Osteopenia  ?DEXA prior to next visit at [facility]  Ordered:  Office/Outpatient Visit Level 5 Established 22056 PC, Human immunodeficiency virus infection  HTN (hypertension)(  Confirmed  )  HLD (hyperlipidemia)  Vitamin D deficiency(  Confirmed  )  Osteopenia, [facility], 06/21/17 7:57:00 CDT  XR Bone Density Complete, Routine, *Est. 06/21/17 3:00:00 CDT, Screening, None, Ambulatory, Rad Type, Order for future visit, Osteopenia, Schedule this test, [facility], Sometime Before, *Est. 06/21/17 3:00:00 CDT  ?  6.?Pap smear of anus with ASCUS  ?will repeat anal pap next visit  ?  Orders:  CBC w/ Auto Diff, Routine collect, *Est. 09/21/17 3:00:00 CDT, Blood, Order for future visit, *Est. Stop date 09/21/17 3:00:00 CDT, Lab Collect, Prostate cancer screening, 3, month(s), In Approximately, 09/21/17 3:00:00 CDT  CD4 Lymphoctye Count, Routine collect, *Est. 09/21/17 3:00:00 CDT, Blood, Order for future visit, *Est. Stop date 09/21/17 3:00:00 CDT, Lab Collect, Prostate cancer screening, 3, month(s), In Approximately, 09/21/17 3:00:00 CDT  Comprehensive Metabolic Panel, Routine collect, *Est. 09/21/17 3:00:00 CDT, Blood, Order for future visit, *Est. Stop date 09/21/17 3:00:00 CDT, Lab Collect, Prostate cancer screening, 3, month(s), In Approximately, 09/21/17 3:00:00 CDT  HIV-1 RNA Qnt By Real-Time PCR-[facility] [id number], Routine collect, *Est. 09/21/17 3:00:00 CDT, Blood, Order for future visit, *Est. Stop date 09/21/17 3:00:00 CDT, Lab Collect, Prostate cancer screening, 3, month(s), In Approximately, 09/21/17 3:00:00 CDT  Lipid Panel, Routine collect, *Est. 09/21/17 3:00:00 CDT, Blood, Order for future visit, *Est. Stop date 09/21/17 3:00:00 CDT, Lab Collect, Prostate cancer screening, 3, month(s), In Approximately, 06/21/17 7:57:00  CDT  Prostate Specific Antigen, Routine collect, *Est. 09/21/17 3:00:00 CDT, Blood, Order for future visit, *Est. Stop date 09/21/17 3:00:00 CDT, Lab Collect, Prostate cancer screening, 3, month(s), In Approximately, 06/21/17 7:57:00 CDT   Problem List/Past Medical History  Abdominal pain(  Confirmed  )  ADHD (attention deficit hyperactivity disorder)(  Confirmed  )  Backache(  Confirmed  )  Blood in the stool(  Confirmed  )  Chronic diarrhea(  Confirmed  )  DDD (degenerative disc disease)(  Confirmed  )  Depression(  Confirmed  )  HIV (human immunodeficiency virus infection)(  Confirmed  )  HTN (hypertension)(  Confirmed  )  Latent syphilis(  Confirmed  )  Obesity(  Probable Diagnosis  )  Vitamin D deficiency(  Confirmed  )  Historical  Cholecystectomy  HIV  Procedure/Surgical History  Colonoscopy (07/01/2014), Colonoscopy, flexible, proximal to splenic flexure; with removal of tumor(s), polyp(s), or other lesion(s) by hot biopsy forceps or bipolar cautery. (07/01/2014), Endoscopic destruction of other lesion or tissue of large intestine (07/01/2014), Polypectomy (07/01/2014), Cholecystectomy (2000).  Medications  Adderall  aspirin 81 mg oral tablet, 81 mg, 1 tab(s), Oral, Daily  Brintellix  calcium (as citrate)-vitamin D 200 mg-250 intl units oral tablet  Crestor 10 mg oral tablet, 10 mg, 1 tab(s), Oral, Daily, 5 refills  Diphen 50 mg/mL injectable solution, 50 mg, 1 mL, IV Push, Once-chemo  ergocalciferol 50,000 intUnit oral capsule, 83202 IntUnit, 1 cap(s), Oral, qWeek, 5 refills  Genvoya oral tablet, 1 tab(s), Oral, Daily, 5 refills  hydrochlorothiazide-lisinopril 12.5 mg-10 mg oral tablet, 1 tab(s), Oral, Daily, 5 refills  meperidine, 100 mg, 1 mL, IV Push, Titrate  [place] [employer] Triple Strength Red Krill Oil 1000 mg oral capsule, 1000 mg, 1 cap(s), Oral, BID  Versed, 6 mg, 6 mL, IV, Titrate  Allergies  No Known Allergies  Social  History  Alcohol  Current  Employment/School  Disabled, Highest education level: High school.  Exercise  Home/Environment  Lives with Alone. Living situation: Home/Independent.  Nutrition/Health  Regular  Substance Abuse  Past, Marijuana  Tobacco  Former smoker, Cigarettes, Started age 20.0 Years.  Family History  CA - Cancer of colon: Other (Dx at 56).  Immunizations  UTD [2]  Health Maintenance  anal pap 9/14/15 NILM, 2/15/17 ASCUS HPV 16+  anal ct/gc 9/14/15 + chlamydia, 2/15/17 neg  oral ct/gc 12/15/15 neg, 2/15/17 neg  urine ct/gc 11/6/14 negative, 6/2016 neg, 6/21/17  ophth?1/2016 fundus?photo wnl, declines referral 6/21/17 7/14 colonoscopy--benign polyps, repeat in 3 yrs (7/17)  12/19/14 T score -2.0, ordered 2/17, 6/21/17  Lab Results  Creatinine 0.96 mg/dL 06/20/2017 09:15 CDT  AST 34 unit/L 06/20/2017 09:15 CDT  ALT 57 unit/L 06/20/2017 09:15 CDT  Hgb A1c 6.1 % 02/10/2017 09:22 CST  PSA 0.43 ng/mL 03/09/2016 08:36 CST  Vit D 25 OH 32.84 ng/mL 02/10/2017 09:22 CST  Chol 141 mg/dL 02/10/2017 09:22 CST  HDL 49 mg/dL 02/10/2017 09:22 CST  Trig 223 mg/dL 02/10/2017 09:22 CST (High)  LDL 47 mg/dL 02/10/2017 09:22 CST  Chol/HDL 2.9 02/10/2017 09:22 CST  TSH 1.590 mIU/mL 06/20/2017 09:15 CDT  WBC 6.2 x10(3)/mcL 06/20/2017 09:15 CDT  Hgb 16.6 gm/dL 06/20/2017 09:15 CDT  Hct 47.4 % 06/20/2017 09:15 CDT  Platelet 269 x10(3)/mcL 06/20/2017 09:15 CDT  RPR Non-Reactive 06/20/2017 09:15 CDT  Chlamydia trach-LC Negative 06/20/2016 08:34 CDT  N gonorrhoeae SHWETA-LC Negative 06/20/2016 08:34 CDT  Chl Trach-Rectal-LC Negative 02/15/2017 16:30 CST  N gonor-Rectal-LC Negative 02/15/2017 16:30 CST  Chl Bldhb-Hqnksb-NQ Negative 02/15/2017 09:00 CST  N kuyzu-Ywxbrq-FH Negative 02/15/2017 09:00 CST     [1]?Office Visit Note; Paula Luevano 02/15/2017 09:20 CST  [2]?Office Visit Note; Paula Luevano 02/15/2017 09:20 CST  [3]?Office Visit Note; Paula Luevano 02/15/2017 09:20 CST

## 2022-09-07 ENCOUNTER — TELEPHONE (OUTPATIENT)
Dept: INFECTIOUS DISEASES | Facility: CLINIC | Age: 62
End: 2022-09-07
Payer: MEDICARE

## 2022-09-07 DIAGNOSIS — B20 HIV DISEASE: Primary | ICD-10-CM

## 2022-09-07 NOTE — TELEPHONE ENCOUNTER
I called and spoke with pt and informed him he is in need of labs prior to any refill authorizations. I also let him know he needs to contact clinic once he comes in for labs so that refills can be sent to pharmacy. Pt verbalizes understanding and stated he will come in the next few days for labs  Lab orders placed

## 2022-09-07 NOTE — TELEPHONE ENCOUNTER
----- Message from Gary Yancey sent at 9/7/2022 12:37 PM CDT -----  Regarding: Patient Called  #Paula#    Patient is needing a refill on Biktarvy routed to [facility] on [address]. [phone]

## 2022-09-08 ENCOUNTER — LAB VISIT (OUTPATIENT)
Dept: LAB | Facility: HOSPITAL | Age: 62
End: 2022-09-08
Attending: NURSE PRACTITIONER
Payer: MEDICARE

## 2022-09-08 DIAGNOSIS — B20 HIV DISEASE: ICD-10-CM

## 2022-09-08 LAB
ALBUMIN SERPL-MCNC: 4.4 GM/DL (ref 3.4–4.8)
ALBUMIN/GLOB SERPL: 1.5 RATIO (ref 1.1–2)
ALP SERPL-CCNC: 89 UNIT/L (ref 40–150)
ALT SERPL-CCNC: 31 UNIT/L (ref 0–55)
AST SERPL-CCNC: 21 UNIT/L (ref 5–34)
BASOPHILS # BLD AUTO: 0.03 X10(3)/MCL (ref 0–0.2)
BASOPHILS NFR BLD AUTO: 0.6 %
BILIRUBIN DIRECT+TOT PNL SERPL-MCNC: 1.6 MG/DL
BUN SERPL-MCNC: 14.4 MG/DL (ref 8.4–25.7)
CALCIUM SERPL-MCNC: 9.9 MG/DL (ref 8.8–10)
CHLORIDE SERPL-SCNC: 105 MMOL/L (ref 98–107)
CO2 SERPL-SCNC: 23 MMOL/L (ref 23–31)
CREAT SERPL-MCNC: 0.83 MG/DL (ref 0.73–1.18)
EOSINOPHIL # BLD AUTO: 0.06 X10(3)/MCL (ref 0–0.9)
EOSINOPHIL NFR BLD AUTO: 1.3 %
ERYTHROCYTE [DISTWIDTH] IN BLOOD BY AUTOMATED COUNT: 12.3 % (ref 11.5–17)
GFR SERPLBLD CREATININE-BSD FMLA CKD-EPI: >60 MLS/MIN/1.73/M2
GLOBULIN SER-MCNC: 2.9 GM/DL (ref 2.4–3.5)
GLUCOSE SERPL-MCNC: 118 MG/DL (ref 82–115)
HCT VFR BLD AUTO: 47.7 % (ref 42–52)
HGB BLD-MCNC: 16.3 GM/DL (ref 14–18)
IMM GRANULOCYTES # BLD AUTO: 0.01 X10(3)/MCL (ref 0–0.04)
IMM GRANULOCYTES NFR BLD AUTO: 0.2 %
LYMPHOCYTES # BLD AUTO: 2.11 X10(3)/MCL (ref 0.6–4.6)
LYMPHOCYTES NFR BLD AUTO: 45.3 %
MCH RBC QN AUTO: 32.2 PG (ref 27–31)
MCHC RBC AUTO-ENTMCNC: 34.2 MG/DL (ref 33–36)
MCV RBC AUTO: 94.3 FL (ref 80–94)
MONOCYTES # BLD AUTO: 0.44 X10(3)/MCL (ref 0.1–1.3)
MONOCYTES NFR BLD AUTO: 9.4 %
NEUTROPHILS # BLD AUTO: 2 X10(3)/MCL (ref 2.1–9.2)
NEUTROPHILS NFR BLD AUTO: 43.2 %
NRBC BLD AUTO-RTO: 0 %
PLATELET # BLD AUTO: 255 X10(3)/MCL (ref 130–400)
PMV BLD AUTO: 9.5 FL (ref 7.4–10.4)
POTASSIUM SERPL-SCNC: 3.9 MMOL/L (ref 3.5–5.1)
PROT SERPL-MCNC: 7.3 GM/DL (ref 5.8–7.6)
RBC # BLD AUTO: 5.06 X10(6)/MCL (ref 4.7–6.1)
SODIUM SERPL-SCNC: 137 MMOL/L (ref 136–145)
WBC # SPEC AUTO: 4.7 X10(3)/MCL (ref 4.5–11.5)

## 2022-09-08 PROCEDURE — 36415 COLL VENOUS BLD VENIPUNCTURE: CPT

## 2022-09-08 PROCEDURE — 85025 COMPLETE CBC W/AUTO DIFF WBC: CPT

## 2022-09-08 PROCEDURE — 86361 T CELL ABSOLUTE COUNT: CPT

## 2022-09-08 PROCEDURE — 84075 ASSAY ALKALINE PHOSPHATASE: CPT

## 2022-09-08 PROCEDURE — 87536 HIV-1 QUANT&REVRSE TRNSCRPJ: CPT

## 2022-09-08 PROCEDURE — 80053 COMPREHEN METABOLIC PANEL: CPT

## 2022-09-09 RX ORDER — BICTEGRAVIR SODIUM, EMTRICITABINE, AND TENOFOVIR ALAFENAMIDE FUMARATE 50; 200; 25 MG/1; MG/1; MG/1
1 TABLET ORAL DAILY
COMMUNITY
Start: 2022-08-12 | End: 2022-09-09 | Stop reason: SDUPTHER

## 2022-09-10 LAB
AGE: 60
CD3+CD4+ CELLS # BLD: 45 % (ref 28–48)
CD3+CD4+ CELLS # SPEC: 780.43 UNIT/L (ref 589–1505)
CD3+CD4+ CELLS NFR BLD: 36.9 %
HIV1 RNA # PLAS NAA DL=20: NORMAL COPIES/ML
LYMPHOCYTES # BLD AUTO: 2115 X10(3)/MCL (ref 1260–5520)
LYMPHOMA - T-CELL MARKERS SPEC-IMP: NORMAL
WBC # BLD AUTO: 4700 /MM3 (ref 4500–11500)

## 2022-09-12 ENCOUNTER — TELEPHONE (OUTPATIENT)
Dept: INFECTIOUS DISEASES | Facility: CLINIC | Age: 62
End: 2022-09-12
Payer: MEDICARE

## 2022-09-12 RX ORDER — BICTEGRAVIR SODIUM, EMTRICITABINE, AND TENOFOVIR ALAFENAMIDE FUMARATE 50; 200; 25 MG/1; MG/1; MG/1
1 TABLET ORAL DAILY
Qty: 30 TABLET | Refills: 0 | Status: SHIPPED | OUTPATIENT
Start: 2022-09-12 | End: 2022-09-21 | Stop reason: SDUPTHER

## 2022-09-12 NOTE — TELEPHONE ENCOUNTER
I called and informed pt that refill was proposed to provider who is currently in clinic and will be authorized once she is done with clinic. Pt verbalizes understanding

## 2022-09-12 NOTE — TELEPHONE ENCOUNTER
----- Message from Laisha Madison sent at 9/12/2022  8:04 AM CDT -----  Regarding: Pt called  #MARIBETH PT#      PT called states labs are done when will Rx of BIKTARVY -25 mg (25 kg or greater) be called in to [facility]        Pt call back [phone]

## 2022-09-21 ENCOUNTER — OFFICE VISIT (OUTPATIENT)
Dept: INFECTIOUS DISEASES | Facility: CLINIC | Age: 62
End: 2022-09-21
Payer: MEDICARE

## 2022-09-21 VITALS
WEIGHT: 246.5 LBS | BODY MASS INDEX: 37.36 KG/M2 | TEMPERATURE: 98 F | SYSTOLIC BLOOD PRESSURE: 143 MMHG | HEART RATE: 87 BPM | DIASTOLIC BLOOD PRESSURE: 86 MMHG | HEIGHT: 68 IN | RESPIRATION RATE: 16 BRPM

## 2022-09-21 DIAGNOSIS — F17.211 NICOTINE DEPENDENCE, CIGARETTES, IN REMISSION: ICD-10-CM

## 2022-09-21 DIAGNOSIS — B20 HIV DISEASE: Primary | ICD-10-CM

## 2022-09-21 DIAGNOSIS — R63.5 ABNORMAL WEIGHT GAIN: ICD-10-CM

## 2022-09-21 DIAGNOSIS — R73.9 HYPERGLYCEMIA, UNSPECIFIED: ICD-10-CM

## 2022-09-21 DIAGNOSIS — E66.9 OBESITY (BMI 35.0-39.9 WITHOUT COMORBIDITY): ICD-10-CM

## 2022-09-21 DIAGNOSIS — Z23 NEED FOR VACCINATION: ICD-10-CM

## 2022-09-21 DIAGNOSIS — E55.9 VITAMIN D DEFICIENCY: ICD-10-CM

## 2022-09-21 PROCEDURE — 99215 PR OFFICE/OUTPT VISIT, EST, LEVL V, 40-54 MIN: ICD-10-PCS | Mod: S$PBB,,, | Performed by: NURSE PRACTITIONER

## 2022-09-21 PROCEDURE — 1160F PR REVIEW ALL MEDS BY PRESCRIBER/CLIN PHARMACIST DOCUMENTED: ICD-10-PCS | Mod: CPTII,,, | Performed by: NURSE PRACTITIONER

## 2022-09-21 PROCEDURE — 1160F RVW MEDS BY RX/DR IN RCRD: CPT | Mod: CPTII,,, | Performed by: NURSE PRACTITIONER

## 2022-09-21 PROCEDURE — 3079F PR MOST RECENT DIASTOLIC BLOOD PRESSURE 80-89 MM HG: ICD-10-PCS | Mod: CPTII,,, | Performed by: NURSE PRACTITIONER

## 2022-09-21 PROCEDURE — 99213 OFFICE O/P EST LOW 20 MIN: CPT | Mod: PBBFAC,25 | Performed by: NURSE PRACTITIONER

## 2022-09-21 PROCEDURE — 3008F PR BODY MASS INDEX (BMI) DOCUMENTED: ICD-10-PCS | Mod: CPTII,,, | Performed by: NURSE PRACTITIONER

## 2022-09-21 PROCEDURE — 99215 OFFICE O/P EST HI 40 MIN: CPT | Mod: S$PBB,,, | Performed by: NURSE PRACTITIONER

## 2022-09-21 PROCEDURE — 3077F PR MOST RECENT SYSTOLIC BLOOD PRESSURE >= 140 MM HG: ICD-10-PCS | Mod: CPTII,,, | Performed by: NURSE PRACTITIONER

## 2022-09-21 PROCEDURE — 1159F MED LIST DOCD IN RCRD: CPT | Mod: CPTII,,, | Performed by: NURSE PRACTITIONER

## 2022-09-21 PROCEDURE — 3079F DIAST BP 80-89 MM HG: CPT | Mod: CPTII,,, | Performed by: NURSE PRACTITIONER

## 2022-09-21 PROCEDURE — 3008F BODY MASS INDEX DOCD: CPT | Mod: CPTII,,, | Performed by: NURSE PRACTITIONER

## 2022-09-21 PROCEDURE — 3077F SYST BP >= 140 MM HG: CPT | Mod: CPTII,,, | Performed by: NURSE PRACTITIONER

## 2022-09-21 PROCEDURE — G0008 ADMIN INFLUENZA VIRUS VAC: HCPCS | Mod: PBBFAC

## 2022-09-21 PROCEDURE — 1159F PR MEDICATION LIST DOCUMENTED IN MEDICAL RECORD: ICD-10-PCS | Mod: CPTII,,, | Performed by: NURSE PRACTITIONER

## 2022-09-21 RX ORDER — DEXTROAMPHETAMINE SACCHARATE, AMPHETAMINE ASPARTATE, DEXTROAMPHETAMINE SULFATE AND AMPHETAMINE SULFATE 5; 5; 5; 5 MG/1; MG/1; MG/1; MG/1
1 TABLET ORAL 3 TIMES DAILY
COMMUNITY
Start: 2022-08-25

## 2022-09-21 RX ORDER — VORTIOXETINE 20 MG/1
1 TABLET, FILM COATED ORAL DAILY
COMMUNITY
Start: 2022-08-21

## 2022-09-21 RX ORDER — ERGOCALCIFEROL 1.25 MG/1
50000 CAPSULE ORAL
COMMUNITY
Start: 2022-09-08 | End: 2022-09-21 | Stop reason: SDUPTHER

## 2022-09-21 RX ORDER — BICTEGRAVIR SODIUM, EMTRICITABINE, AND TENOFOVIR ALAFENAMIDE FUMARATE 50; 200; 25 MG/1; MG/1; MG/1
1 TABLET ORAL DAILY
Qty: 30 TABLET | Refills: 0 | Status: SHIPPED | OUTPATIENT
Start: 2022-09-21 | End: 2022-11-07

## 2022-09-21 RX ORDER — CARIPRAZINE 3 MG/1
CAPSULE, GELATIN COATED ORAL
COMMUNITY
Start: 2022-08-14

## 2022-09-21 RX ORDER — ERGOCALCIFEROL 1.25 MG/1
50000 CAPSULE ORAL
Qty: 5 CAPSULE | Refills: 6 | Status: SHIPPED | OUTPATIENT
Start: 2022-09-21 | End: 2023-11-03 | Stop reason: SDUPTHER

## 2022-09-21 NOTE — PROGRESS NOTES
Subjective:       Patient ID: Yoan Powell is a 61 y.o. male.    Chief Complaint: b20 f/u    9/21/22  Zackary is a 62 yo WM presenting today for HIV f/u visit. He is taking Biktarvy daily without any noted side effects.  Labs collected 9/8/22 VL UD, CD4 780.  He has been evaluated by [facility] and will now be followed annually. He is taking vitamin d weekly as prescribed, will check level again next labs.  He appreciates flu vax today. Will get Shingrix vaccine at local pharmacy due to insurance restrictions. Will also seek variant specific COVID booster, available at [facility] vaccination site on [address] in [place]. Denies any sexual activity since last STI screenings.  Due for repeat colonoscopy, will reach out to Dr. Coleman for same.  Routine eye exam UTD, attended visit within past 6 months. All questions answered & concerns addressed.     2/14/22  Zackary is a 62 yo WM presenting today for HIV f/u visit.  He reports 100% adherent to Biktarvy, tolerates well with viral suppression.  Last labs 2/2021 VL <20, CD4 560.  Will go to [facility] today for labs.  He is scheduled this week for f/u with [facility].  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  He has not been sexually active in some time, repeat STI screening swabs not needed at this time.  Will repeat RPR titer today.  Appreciates flu vax today.  He is taking vitamin d weekly, will check level today.     8/12/21  Zackary is a 59 yo WM presenting today for HIV f/u visit.  He reports 100% adherent to Biktarvy, tolerates well with viral suppression.  Last labs 2/21 VL <20, Cd4 560.  He will go to [facility] next week for updated labs.  He attended ophth exam 6/2021.  He is taking vitamin d2 weekly, but does need a refill.  He did attend appt for HRA at [facility] and did have a biopsy taken as well.  He tells me that he did not receive results.  Will request records & call pt with update.  Voiced appreciation.  He is fully vaccinated against COVID,  received Pfizer #2 3/25/21.  COVID precautions maintained.  He states that he is not sexually active, routine screening swabs not needed at this time.       Review of Systems   Constitutional: Negative.    HENT: Negative.     Respiratory: Negative.     Cardiovascular: Negative.    Gastrointestinal: Negative.    Genitourinary: Negative.    Integumentary:  Negative.   Neurological: Negative.    Hematological: Negative.    Psychiatric/Behavioral: Negative.         Objective:      Physical Exam  Vitals reviewed.   Constitutional:       General: He is not in acute distress.     Appearance: Normal appearance. He is not toxic-appearing.   HENT:      Mouth/Throat:      Mouth: Mucous membranes are moist.      Pharynx: Oropharynx is clear.   Eyes:      Conjunctiva/sclera: Conjunctivae normal.   Cardiovascular:      Rate and Rhythm: Normal rate and regular rhythm.   Pulmonary:      Effort: Pulmonary effort is normal. No respiratory distress.      Breath sounds: Normal breath sounds.   Abdominal:      General: Abdomen is flat. Bowel sounds are normal.      Palpations: Abdomen is soft.   Musculoskeletal:         General: Normal range of motion.      Cervical back: Normal range of motion.   Lymphadenopathy:      Cervical: No cervical adenopathy.   Skin:     General: Skin is warm and dry.   Neurological:      General: No focal deficit present.      Mental Status: He is alert and oriented to person, place, and time. Mental status is at baseline.   Psychiatric:         Mood and Affect: Mood normal.         Behavior: Behavior normal.       Assessment:       Problem List Items Addressed This Visit    None  Visit Diagnoses       HIV disease    -  Primary    Relevant Medications    BIKTARVY -25 mg (25 kg or greater)    Other Relevant Orders    HIV-1 RNA, Quantitative, PCR with Reflex to Genotype    CD4 T-[place] Cells    Urinalysis    Hepatitis C Antibody    SYPHILIS ANTIBODY (WITH REFLEX RPR)    Chlamydia/GC, PCR    Comprehensive  Metabolic Panel    CBC Auto Differential    Quantiferon Gold TB    Need for vaccination        Relevant Orders    Influenza - Quadrivalent (PF) (Completed)    Vitamin D deficiency        Relevant Medications    ergocalciferol (ERGOCALCIFEROL) 50,000 unit Cap    Other Relevant Orders    Vitamin D    Nicotine dependence, cigarettes, in remission        Relevant Orders    CT Chest Lung Screening Low Dose    Obesity (BMI 35.0-39.9 without comorbidity)        Relevant Orders    TSH    Hemoglobin A1C    Lipid Panel    Abnormal weight gain        Relevant Orders    TSH    Hyperglycemia, unspecified        Relevant Orders    Hemoglobin A1C              Plan:           HIV disease  -     BIKTARVY -25 mg (25 kg or greater); Take 1 tablet by mouth once daily.  Dispense: 30 tablet; Refill: 0  -     HIV-1 RNA, Quantitative, PCR with Reflex to Genotype; Future; Expected date: 03/21/2023  -     CD4 T-[place] Cells; Future; Expected date: 03/21/2023  -     Urinalysis; Future; Expected date: 03/21/2023  -     Hepatitis C Antibody; Future; Expected date: 03/21/2023  -     SYPHILIS ANTIBODY (WITH REFLEX RPR); Future; Expected date: 03/21/2023  -     Chlamydia/GC, PCR; Future; Expected date: 03/21/2023  -     Comprehensive Metabolic Panel; Future; Expected date: 03/21/2023  -     CBC Auto Differential; Future; Expected date: 03/21/2023  -     Quantiferon Gold TB; Future; Expected date: 03/21/2023  Adherence and sexual health counseling done.  Use condoms for all sexual encounters.  Blood precautions.   Continue Biktarvy as prescribed.  Labs prior to next visit.   RTC 6 months with jonas Mitchell.    HIV Wellness:  Anal pap: HRA 2022 with annual f/u at [facility]  Oral CT/GC: 5/20 Neg  Anal CT/GC: 5/20 Neg  Urine CT/GC: 2/22 Neg  RPR: 2/22 serofast 1:2 titer   Ophth: 2022  DEXA:  Colon Cancer Screen:  Dr. Coleman colonoscopy     Need for vaccination  -     Influenza - Quadrivalent (PF)  Flu vax today     Vitamin D deficiency  -     " ergocalciferol (ERGOCALCIFEROL) 50,000 unit Cap; Take 1 capsule (50,000 Units total) by mouth every 7 days.  Dispense: 5 capsule; Refill: 6  -     Vitamin D; Future; Expected date: 03/21/2023  Continue as prescribed.  Repeat level next labs.     Nicotine dependence, cigarettes, in remission  -     CT Chest Lung Screening Low Dose; Future; Expected date: 10/21/2022  Quit x 1 year.   > 30 pack year smoking history.  Low dose CT chest for lung cancer screening.     Obesity (BMI 35.0-39.9 without comorbidity)  -     TSH; Future; Expected date: 03/21/2023  -     Hemoglobin A1C; Future; Expected date: 03/21/2023  -     Lipid Panel; Future; Expected date: 03/21/2023  Labs as ordered for wellness.  Goal BMI <30.   Increase exercise activity to 30 minutes 3-5 times per week.  Avoid sugar sodas, simple sugars, sweets, large portions of rice, pasta, potatoes, or bread.   Choose whole grain/"brown" options when available, practice portion control.  Limit fast foods and fried foods.  Increase intake of fresh fruits and vegetables with lean meats (chicken, fish, etc.) daily.   Minimize alcohol intake, including beer and wine.  Consider permanent healthy lifestyle changes.     Abnormal weight gain  -     TSH; Future; Expected date: 03/21/2023    Hyperglycemia, unspecified  -     Hemoglobin A1C; Future; Expected date: 03/21/2023     I spent a total of 43 minutes on the day of the visit.  This includes face to face time and non-face to face time preparing to see the patient (eg, review of tests), obtaining and/or reviewing separately obtained history, documenting clinical information in the electronic or other health record, independently interpreting results and communicating results to the patient/family/caregiver, or care coordinator.   "

## 2022-09-27 ENCOUNTER — DOCUMENTATION ONLY (OUTPATIENT)
Dept: ADMINISTRATIVE | Facility: HOSPITAL | Age: 62
End: 2022-09-27
Payer: MEDICARE

## 2023-04-06 ENCOUNTER — TELEPHONE (OUTPATIENT)
Dept: INFECTIOUS DISEASES | Facility: CLINIC | Age: 63
End: 2023-04-06
Payer: MEDICARE

## 2023-04-06 DIAGNOSIS — B20 HIV DISEASE: Primary | ICD-10-CM

## 2023-04-06 RX ORDER — BICTEGRAVIR SODIUM, EMTRICITABINE, AND TENOFOVIR ALAFENAMIDE FUMARATE 50; 200; 25 MG/1; MG/1; MG/1
1 TABLET ORAL DAILY
Qty: 30 TABLET | Refills: 1 | Status: SHIPPED | OUTPATIENT
Start: 2023-04-06 | End: 2023-04-24 | Stop reason: SDUPTHER

## 2023-04-06 NOTE — TELEPHONE ENCOUNTER
Phoned patient. Did reschedule apt but only has 4 or 5 tablets of medication left. Requests refill. Appreciates call

## 2023-04-06 NOTE — TELEPHONE ENCOUNTER
----- Message from Shivani Whitney sent at 4/6/2023  9:37 AM CDT -----  Regarding: pt call  Paula Dao r/sed 4/11 appt to an in-office appt on 4/24 (had trouble setting up perla); stated he is worried that he may run out of meds before then; asking for a call back @ [phone]

## 2023-04-20 ENCOUNTER — LAB VISIT (OUTPATIENT)
Dept: LAB | Facility: HOSPITAL | Age: 63
End: 2023-04-20
Attending: NURSE PRACTITIONER
Payer: MEDICARE

## 2023-04-20 DIAGNOSIS — R73.9 HYPERGLYCEMIA, UNSPECIFIED: ICD-10-CM

## 2023-04-20 DIAGNOSIS — E55.9 VITAMIN D DEFICIENCY: ICD-10-CM

## 2023-04-20 DIAGNOSIS — R63.5 ABNORMAL WEIGHT GAIN: ICD-10-CM

## 2023-04-20 DIAGNOSIS — B20 HIV DISEASE: ICD-10-CM

## 2023-04-20 DIAGNOSIS — E66.9 OBESITY (BMI 35.0-39.9 WITHOUT COMORBIDITY): ICD-10-CM

## 2023-04-20 LAB
ALBUMIN SERPL-MCNC: 4.2 G/DL (ref 3.4–4.8)
ALBUMIN/GLOB SERPL: 1.4 RATIO (ref 1.1–2)
ALP SERPL-CCNC: 83 UNIT/L (ref 40–150)
ALT SERPL-CCNC: 33 UNIT/L (ref 0–55)
AST SERPL-CCNC: 22 UNIT/L (ref 5–34)
BASOPHILS # BLD AUTO: 0.03 X10(3)/MCL (ref 0–0.2)
BASOPHILS NFR BLD AUTO: 0.7 %
BILIRUBIN DIRECT+TOT PNL SERPL-MCNC: 0.9 MG/DL
BUN SERPL-MCNC: 16 MG/DL (ref 8.4–25.7)
CALCIUM SERPL-MCNC: 9.7 MG/DL (ref 8.8–10)
CHLORIDE SERPL-SCNC: 106 MMOL/L (ref 98–107)
CHOLEST SERPL-MCNC: 178 MG/DL
CHOLEST/HDLC SERPL: 4 {RATIO} (ref 0–5)
CO2 SERPL-SCNC: 24 MMOL/L (ref 23–31)
CREAT SERPL-MCNC: 0.91 MG/DL (ref 0.73–1.18)
DEPRECATED CALCIDIOL+CALCIFEROL SERPL-MC: 26.5 NG/ML (ref 30–80)
EOSINOPHIL # BLD AUTO: 0.07 X10(3)/MCL (ref 0–0.9)
EOSINOPHIL NFR BLD AUTO: 1.7 %
ERYTHROCYTE [DISTWIDTH] IN BLOOD BY AUTOMATED COUNT: 12.2 % (ref 11.5–17)
EST. AVERAGE GLUCOSE BLD GHB EST-MCNC: 111.2 MG/DL
GFR SERPLBLD CREATININE-BSD FMLA CKD-EPI: >60 MLS/MIN/1.73/M2
GLOBULIN SER-MCNC: 2.9 GM/DL (ref 2.4–3.5)
GLUCOSE SERPL-MCNC: 105 MG/DL (ref 82–115)
HBA1C MFR BLD: 5.5 %
HCT VFR BLD AUTO: 45.3 % (ref 42–52)
HCV AB SERPL QL IA: NONREACTIVE
HDLC SERPL-MCNC: 46 MG/DL (ref 35–60)
HGB BLD-MCNC: 15.7 G/DL (ref 14–18)
IMM GRANULOCYTES # BLD AUTO: 0.02 X10(3)/MCL (ref 0–0.04)
IMM GRANULOCYTES NFR BLD AUTO: 0.5 %
LDLC SERPL CALC-MCNC: 99 MG/DL (ref 50–140)
LYMPHOCYTES # BLD AUTO: 1.74 X10(3)/MCL (ref 0.6–4.6)
LYMPHOCYTES NFR BLD AUTO: 41.8 %
MCH RBC QN AUTO: 32.3 PG (ref 27–31)
MCHC RBC AUTO-ENTMCNC: 34.7 G/DL (ref 33–36)
MCV RBC AUTO: 93.2 FL (ref 80–94)
MONOCYTES # BLD AUTO: 0.38 X10(3)/MCL (ref 0.1–1.3)
MONOCYTES NFR BLD AUTO: 9.1 %
NEUTROPHILS # BLD AUTO: 1.92 X10(3)/MCL (ref 2.1–9.2)
NEUTROPHILS NFR BLD AUTO: 46.2 %
NRBC BLD AUTO-RTO: 0 %
PLATELET # BLD AUTO: 251 X10(3)/MCL (ref 130–400)
PMV BLD AUTO: 9.4 FL (ref 7.4–10.4)
POTASSIUM SERPL-SCNC: 4 MMOL/L (ref 3.5–5.1)
PROT SERPL-MCNC: 7.1 GM/DL (ref 5.8–7.6)
RBC # BLD AUTO: 4.86 X10(6)/MCL (ref 4.7–6.1)
RPR SER QL: NORMAL
RPR SER-TITR: NORMAL {TITER}
SODIUM SERPL-SCNC: 139 MMOL/L (ref 136–145)
T PALLIDUM AB SER QL: REACTIVE
TRIGL SERPL-MCNC: 164 MG/DL (ref 34–140)
TSH SERPL-ACNC: 2.15 UIU/ML (ref 0.35–4.94)
VLDLC SERPL CALC-MCNC: 33 MG/DL
WBC # SPEC AUTO: 4.2 X10(3)/MCL (ref 4.5–11.5)

## 2023-04-20 PROCEDURE — 86359 T CELLS TOTAL COUNT: CPT | Mod: 90

## 2023-04-20 PROCEDURE — 36415 COLL VENOUS BLD VENIPUNCTURE: CPT

## 2023-04-20 PROCEDURE — 86780 TREPONEMA PALLIDUM: CPT

## 2023-04-20 PROCEDURE — 84443 ASSAY THYROID STIM HORMONE: CPT

## 2023-04-20 PROCEDURE — 86360 T CELL ABSOLUTE COUNT/RATIO: CPT | Mod: 90

## 2023-04-20 PROCEDURE — 83036 HEMOGLOBIN GLYCOSYLATED A1C: CPT

## 2023-04-20 PROCEDURE — 86480 TB TEST CELL IMMUN MEASURE: CPT | Mod: 90

## 2023-04-20 PROCEDURE — 82306 VITAMIN D 25 HYDROXY: CPT

## 2023-04-20 PROCEDURE — 80061 LIPID PANEL: CPT

## 2023-04-20 PROCEDURE — 85025 COMPLETE CBC W/AUTO DIFF WBC: CPT

## 2023-04-20 PROCEDURE — 80053 COMPREHEN METABOLIC PANEL: CPT

## 2023-04-20 PROCEDURE — 86780 TREPONEMA PALLIDUM: CPT | Mod: 90

## 2023-04-20 PROCEDURE — 86803 HEPATITIS C AB TEST: CPT

## 2023-04-20 PROCEDURE — 86592 SYPHILIS TEST NON-TREP QUAL: CPT

## 2023-04-20 PROCEDURE — 87536 HIV-1 QUANT&REVRSE TRNSCRPJ: CPT | Mod: 90

## 2023-04-21 LAB
CD3 CELLS # BLD: 1213 CELLS/MCL (ref 550–2202)
CD3 CELLS NFR BLD: 74 % (ref 58–86)
CD3+CD4+ CELLS # BLD: 599 CELLS/MCL (ref 365–1437)
CD3+CD4+ CELLS NFR BLD: 37 % (ref 32–64)
CD3+CD4+ CELLS/CD3+CD8+ CLL BLD: 0.9 %
CD3+CD8+ CELLS # BLD: 673 CELLS/MCL (ref 80–846)
CD3+CD8+ CELLS NFR BLD: 41 % (ref 8–40)
CD45 CELLS # BLD: 1.63 THOU/MCL (ref 0.82–2.84)
HIV1 RNA # PLAS NAA DL=20: NORMAL COPIES/ML

## 2023-04-23 LAB — T PALLIDUM AB SER QL: REACTIVE

## 2023-04-24 ENCOUNTER — OFFICE VISIT (OUTPATIENT)
Dept: INFECTIOUS DISEASES | Facility: CLINIC | Age: 63
End: 2023-04-24
Payer: MEDICARE

## 2023-04-24 DIAGNOSIS — B20 HIV DISEASE: Primary | ICD-10-CM

## 2023-04-24 LAB
GAMMA INTERFERON BACKGROUND BLD IA-ACNC: 0.17 IU/ML
M TB IFN-G BLD-IMP: NEGATIVE
M TB IFN-G CD4+ BCKGRND COR BLD-ACNC: -0.06 IU/ML
M TB IFN-G CD4+CD8+ BCKGRND COR BLD-ACNC: -0.05 IU/ML
MITOGEN IGNF BCKGRD COR BLD-ACNC: 9.83 IU/ML

## 2023-04-24 PROCEDURE — 99211 OFF/OP EST MAY X REQ PHY/QHP: CPT | Mod: PBBFAC | Performed by: NURSE PRACTITIONER

## 2023-04-24 PROCEDURE — 1160F PR REVIEW ALL MEDS BY PRESCRIBER/CLIN PHARMACIST DOCUMENTED: ICD-10-PCS | Mod: CPTII,,, | Performed by: NURSE PRACTITIONER

## 2023-04-24 PROCEDURE — 1159F MED LIST DOCD IN RCRD: CPT | Mod: CPTII,,, | Performed by: NURSE PRACTITIONER

## 2023-04-24 PROCEDURE — 99214 OFFICE O/P EST MOD 30 MIN: CPT | Mod: S$PBB,,, | Performed by: NURSE PRACTITIONER

## 2023-04-24 PROCEDURE — 1159F PR MEDICATION LIST DOCUMENTED IN MEDICAL RECORD: ICD-10-PCS | Mod: CPTII,,, | Performed by: NURSE PRACTITIONER

## 2023-04-24 PROCEDURE — 1160F RVW MEDS BY RX/DR IN RCRD: CPT | Mod: CPTII,,, | Performed by: NURSE PRACTITIONER

## 2023-04-24 PROCEDURE — 99214 PR OFFICE/OUTPT VISIT, EST, LEVL IV, 30-39 MIN: ICD-10-PCS | Mod: S$PBB,,, | Performed by: NURSE PRACTITIONER

## 2023-04-24 RX ORDER — BICTEGRAVIR SODIUM, EMTRICITABINE, AND TENOFOVIR ALAFENAMIDE FUMARATE 50; 200; 25 MG/1; MG/1; MG/1
1 TABLET ORAL DAILY
Qty: 90 TABLET | Refills: 1 | Status: SHIPPED | OUTPATIENT
Start: 2023-04-24 | End: 2023-11-03 | Stop reason: SDUPTHER

## 2023-04-24 NOTE — PROGRESS NOTES
Subjective     Patient ID: Yoan Powell is a 62 y.o. male.    Chief Complaint: No chief complaint on file.    4/24/23  Zackary is a 61 yo WM here today for HIV f/u visit.  He takes Biktarvy daily & is virally suppressed. Labs collected 4/20/23 VL UD, Cd4 599.  He takes Vitamin D most weeks, but does forget occasionally.  Level increased but not quite at goal yet. He has forgotten to get Shingrix & COVID vaccinations and to call Dr. Coleman's office for repeat colonoscopy as discussed last visit.  Reminded pt to do so, written reminder provided. Voiced appreciation.  Not sexually active for past 2 + years. Declines need for STI screening swabs at this juncture.  He has recently started to date someone for the past 2 weeks, not physically intimate at this juncture.  All questions answered & concerns addressed.    9/21/22  Zackary is a 62 yo WM presenting today for HIV f/u visit. He is taking Biktarvy daily without any noted side effects.  Labs collected 9/8/22 VL UD, CD4 780.  He has been evaluated by [facility] and will now be followed annually. He is taking vitamin d weekly as prescribed, will check level again next labs.  He appreciates flu vax today. Will get Shingrix vaccine at local pharmacy due to insurance restrictions. Will also seek variant specific COVID booster, available at [facility] vaccination site on [address] in [place]. Denies any sexual activity since last STI screenings.  Due for repeat colonoscopy, will reach out to Dr. Coleman for same.  Routine eye exam UTD, attended visit within past 6 months. All questions answered & concerns addressed.      2/14/22  Zackary is a 62 yo WM presenting today for HIV f/u visit.  He reports 100% adherent to Biktarvy, tolerates well with viral suppression.  Last labs 2/2021 VL <20, CD4 560.  Will go to [facility] today for labs.  He is scheduled this week for f[facility]u with [facility].  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  He has not been sexually active  in some time, repeat STI screening swabs not needed at this time.  Will repeat RPR titer today.  Appreciates flu vax today.  He is taking vitamin d weekly, will check level today.    Review of Systems   Constitutional: Negative.    HENT: Negative.     Respiratory: Negative.     Cardiovascular: Negative.    Gastrointestinal: Negative.    Genitourinary: Negative.    Integumentary:  Negative.   Neurological: Negative.    Hematological: Negative.    Psychiatric/Behavioral: Negative.          Objective     Physical Exam  Vitals reviewed.   Constitutional:       General: He is not in acute distress.     Appearance: Normal appearance. He is not toxic-appearing.   HENT:      Mouth/Throat:      Mouth: Mucous membranes are moist.      Pharynx: Oropharynx is clear.   Eyes:      Conjunctiva/sclera: Conjunctivae normal.   Cardiovascular:      Rate and Rhythm: Normal rate and regular rhythm.   Pulmonary:      Effort: Pulmonary effort is normal. No respiratory distress.      Breath sounds: Normal breath sounds.   Abdominal:      General: Abdomen is flat. Bowel sounds are normal.      Palpations: Abdomen is soft.   Musculoskeletal:         General: Normal range of motion.      Cervical back: Normal range of motion.   Lymphadenopathy:      Cervical: No cervical adenopathy.   Skin:     General: Skin is warm and dry.   Neurological:      General: No focal deficit present.      Mental Status: He is alert and oriented to person, place, and time. Mental status is at baseline.   Psychiatric:         Mood and Affect: Mood normal.         Behavior: Behavior normal.          Assessment and Plan     Problem List Items Addressed This Visit      HIV disease  -     BIKTARVY -25 mg (25 kg or greater); Take 1 tablet by mouth once daily.  Dispense: 90 tablet; Refill: 1  -     CD4 Lymphocytes; Future; Expected date: 09/24/2023  -     CBC Auto Differential; Future; Expected date: 09/24/2023  -     Comprehensive Metabolic Panel; Future; Expected  date: 09/24/2023  -     HIV-1 RNA, Quantitative, PCR with Reflex to Genotype; Future; Expected date: 09/24/2023  Adherence and sexual health counseling done.  Use condoms for all sexual encounters.  Blood precautions.   Continue Biktarvy as prescribed.  Labs prior to next visit.   RTC 6 months with jonas Mitchell.     HIV Wellness:  Anal pap: HRA 2022 with annual f/u at [facility], scheduled 8/23  Oral CT/GC: 5/20 Neg  Anal CT/GC: 5/20 Neg  Urine CT/GC: 2/22 Neg  RPR: 2/22 serofast 1:2 titer, 4/23 NR  Ophth: 2022  DEXA:  Colon Cancer Screen:  Dr. Coleman colonoscopy       Vitamin D deficiency  -     ergocalciferol (ERGOCALCIFEROL) 50,000 unit Cap; Take 1 capsule (50,000 Units total) by mouth every 7 days.  Dispense: 5 capsule; Refill: 6  -     Vitamin D; Future; Expected date: 03/21/2023  Continue as prescribed.

## 2023-09-07 ENCOUNTER — PATIENT MESSAGE (OUTPATIENT)
Dept: RESEARCH | Facility: HOSPITAL | Age: 63
End: 2023-09-07
Payer: MEDICARE

## 2023-11-03 DIAGNOSIS — E55.9 VITAMIN D DEFICIENCY: ICD-10-CM

## 2023-11-03 DIAGNOSIS — B20 HIV DISEASE: ICD-10-CM

## 2023-11-03 NOTE — TELEPHONE ENCOUNTER
----- Message from Ranjana Howell sent at 11/3/2023 12:25 PM CDT -----  Patient of JAMAICA Luis     Patient rescheduled for [facility] on 12/4/23     Patient needs refills on Biktarvy and Vit D     [facility] pharmacy as listed on chart     [phone]    Thanks

## 2023-11-06 RX ORDER — ERGOCALCIFEROL 1.25 MG/1
50000 CAPSULE ORAL
Qty: 5 CAPSULE | Refills: 0 | Status: SHIPPED | OUTPATIENT
Start: 2023-11-06 | End: 2023-12-04 | Stop reason: SDUPTHER

## 2023-11-06 RX ORDER — BICTEGRAVIR SODIUM, EMTRICITABINE, AND TENOFOVIR ALAFENAMIDE FUMARATE 50; 200; 25 MG/1; MG/1; MG/1
1 TABLET ORAL DAILY
Qty: 90 TABLET | Refills: 0 | Status: SHIPPED | OUTPATIENT
Start: 2023-11-06 | End: 2023-12-04 | Stop reason: SDUPTHER

## 2023-12-01 ENCOUNTER — LAB VISIT (OUTPATIENT)
Dept: LAB | Facility: HOSPITAL | Age: 63
End: 2023-12-01
Attending: NURSE PRACTITIONER
Payer: MEDICARE

## 2023-12-01 DIAGNOSIS — B20 HIV DISEASE: ICD-10-CM

## 2023-12-01 LAB
ALBUMIN SERPL-MCNC: 4.3 G/DL (ref 3.4–4.8)
ALBUMIN/GLOB SERPL: 1.4 RATIO (ref 1.1–2)
ALP SERPL-CCNC: 81 UNIT/L (ref 40–150)
ALT SERPL-CCNC: 44 UNIT/L (ref 0–55)
AST SERPL-CCNC: 27 UNIT/L (ref 5–34)
BASOPHILS # BLD AUTO: 0.03 X10(3)/MCL
BASOPHILS NFR BLD AUTO: 0.6 %
BILIRUB SERPL-MCNC: 1 MG/DL
BUN SERPL-MCNC: 17.2 MG/DL (ref 8.4–25.7)
CALCIUM SERPL-MCNC: 9.8 MG/DL (ref 8.8–10)
CHLORIDE SERPL-SCNC: 105 MMOL/L (ref 98–107)
CO2 SERPL-SCNC: 26 MMOL/L (ref 23–31)
CREAT SERPL-MCNC: 0.95 MG/DL (ref 0.73–1.18)
EOSINOPHIL # BLD AUTO: 0.09 X10(3)/MCL (ref 0–0.9)
EOSINOPHIL NFR BLD AUTO: 1.9 %
ERYTHROCYTE [DISTWIDTH] IN BLOOD BY AUTOMATED COUNT: 12.1 % (ref 11.5–17)
GFR SERPLBLD CREATININE-BSD FMLA CKD-EPI: >60 MLS/MIN/1.73/M2
GLOBULIN SER-MCNC: 3 GM/DL (ref 2.4–3.5)
GLUCOSE SERPL-MCNC: 115 MG/DL (ref 82–115)
HCT VFR BLD AUTO: 46.2 % (ref 42–52)
HGB BLD-MCNC: 15.9 G/DL (ref 14–18)
IMM GRANULOCYTES # BLD AUTO: 0.02 X10(3)/MCL (ref 0–0.04)
IMM GRANULOCYTES NFR BLD AUTO: 0.4 %
LYMPHOCYTES # BLD AUTO: 2.08 X10(3)/MCL (ref 0.6–4.6)
LYMPHOCYTES NFR BLD AUTO: 44.5 %
MCH RBC QN AUTO: 33.1 PG (ref 27–31)
MCHC RBC AUTO-ENTMCNC: 34.4 G/DL (ref 33–36)
MCV RBC AUTO: 96 FL (ref 80–94)
MONOCYTES # BLD AUTO: 0.46 X10(3)/MCL (ref 0.1–1.3)
MONOCYTES NFR BLD AUTO: 9.9 %
NEUTROPHILS # BLD AUTO: 1.99 X10(3)/MCL (ref 2.1–9.2)
NEUTROPHILS NFR BLD AUTO: 42.7 %
NRBC BLD AUTO-RTO: 0 %
PLATELET # BLD AUTO: 245 X10(3)/MCL (ref 130–400)
PMV BLD AUTO: 9.1 FL (ref 7.4–10.4)
POTASSIUM SERPL-SCNC: 4.2 MMOL/L (ref 3.5–5.1)
PROT SERPL-MCNC: 7.3 GM/DL (ref 5.8–7.6)
RBC # BLD AUTO: 4.81 X10(6)/MCL (ref 4.7–6.1)
SODIUM SERPL-SCNC: 139 MMOL/L (ref 136–145)
WBC # SPEC AUTO: 4.67 X10(3)/MCL (ref 4.5–11.5)

## 2023-12-01 PROCEDURE — 86361 T CELL ABSOLUTE COUNT: CPT

## 2023-12-01 PROCEDURE — 36415 COLL VENOUS BLD VENIPUNCTURE: CPT

## 2023-12-01 PROCEDURE — 85025 COMPLETE CBC W/AUTO DIFF WBC: CPT

## 2023-12-01 PROCEDURE — 87536 HIV-1 QUANT&REVRSE TRNSCRPJ: CPT

## 2023-12-01 PROCEDURE — 80053 COMPREHEN METABOLIC PANEL: CPT

## 2023-12-04 ENCOUNTER — OFFICE VISIT (OUTPATIENT)
Dept: INFECTIOUS DISEASES | Facility: CLINIC | Age: 63
End: 2023-12-04
Payer: MEDICARE

## 2023-12-04 VITALS
HEART RATE: 79 BPM | BODY MASS INDEX: 38.19 KG/M2 | HEIGHT: 68 IN | DIASTOLIC BLOOD PRESSURE: 80 MMHG | WEIGHT: 252 LBS | SYSTOLIC BLOOD PRESSURE: 137 MMHG | TEMPERATURE: 98 F | RESPIRATION RATE: 20 BRPM

## 2023-12-04 DIAGNOSIS — I10 PRIMARY HYPERTENSION: ICD-10-CM

## 2023-12-04 DIAGNOSIS — E55.9 VITAMIN D DEFICIENCY: ICD-10-CM

## 2023-12-04 DIAGNOSIS — B20 HIV DISEASE: Primary | ICD-10-CM

## 2023-12-04 DIAGNOSIS — Z23 NEED FOR VACCINATION: ICD-10-CM

## 2023-12-04 DIAGNOSIS — R79.9 ABNORMAL FINDING OF BLOOD CHEMISTRY, UNSPECIFIED: ICD-10-CM

## 2023-12-04 PROCEDURE — 99213 OFFICE O/P EST LOW 20 MIN: CPT | Mod: PBBFAC | Performed by: NURSE PRACTITIONER

## 2023-12-04 PROCEDURE — 4010F PR ACE/ARB THEARPY RXD/TAKEN: ICD-10-PCS | Mod: CPTII,,, | Performed by: NURSE PRACTITIONER

## 2023-12-04 PROCEDURE — 3079F PR MOST RECENT DIASTOLIC BLOOD PRESSURE 80-89 MM HG: ICD-10-PCS | Mod: CPTII,,, | Performed by: NURSE PRACTITIONER

## 2023-12-04 PROCEDURE — 3044F HG A1C LEVEL LT 7.0%: CPT | Mod: CPTII,,, | Performed by: NURSE PRACTITIONER

## 2023-12-04 PROCEDURE — 1160F RVW MEDS BY RX/DR IN RCRD: CPT | Mod: CPTII,,, | Performed by: NURSE PRACTITIONER

## 2023-12-04 PROCEDURE — 3008F PR BODY MASS INDEX (BMI) DOCUMENTED: ICD-10-PCS | Mod: CPTII,,, | Performed by: NURSE PRACTITIONER

## 2023-12-04 PROCEDURE — 99214 OFFICE O/P EST MOD 30 MIN: CPT | Mod: S$PBB,,, | Performed by: NURSE PRACTITIONER

## 2023-12-04 PROCEDURE — 3075F PR MOST RECENT SYSTOLIC BLOOD PRESS GE 130-139MM HG: ICD-10-PCS | Mod: CPTII,,, | Performed by: NURSE PRACTITIONER

## 2023-12-04 PROCEDURE — 1160F PR REVIEW ALL MEDS BY PRESCRIBER/CLIN PHARMACIST DOCUMENTED: ICD-10-PCS | Mod: CPTII,,, | Performed by: NURSE PRACTITIONER

## 2023-12-04 PROCEDURE — 93005 ELECTROCARDIOGRAM TRACING: CPT

## 2023-12-04 PROCEDURE — 1159F PR MEDICATION LIST DOCUMENTED IN MEDICAL RECORD: ICD-10-PCS | Mod: CPTII,,, | Performed by: NURSE PRACTITIONER

## 2023-12-04 PROCEDURE — 3075F SYST BP GE 130 - 139MM HG: CPT | Mod: CPTII,,, | Performed by: NURSE PRACTITIONER

## 2023-12-04 PROCEDURE — 3008F BODY MASS INDEX DOCD: CPT | Mod: CPTII,,, | Performed by: NURSE PRACTITIONER

## 2023-12-04 PROCEDURE — 3079F DIAST BP 80-89 MM HG: CPT | Mod: CPTII,,, | Performed by: NURSE PRACTITIONER

## 2023-12-04 PROCEDURE — 1159F MED LIST DOCD IN RCRD: CPT | Mod: CPTII,,, | Performed by: NURSE PRACTITIONER

## 2023-12-04 PROCEDURE — 3044F PR MOST RECENT HEMOGLOBIN A1C LEVEL <7.0%: ICD-10-PCS | Mod: CPTII,,, | Performed by: NURSE PRACTITIONER

## 2023-12-04 PROCEDURE — G0008 ADMIN INFLUENZA VIRUS VAC: HCPCS | Mod: PBBFAC

## 2023-12-04 PROCEDURE — 4010F ACE/ARB THERAPY RXD/TAKEN: CPT | Mod: CPTII,,, | Performed by: NURSE PRACTITIONER

## 2023-12-04 PROCEDURE — 99214 PR OFFICE/OUTPT VISIT, EST, LEVL IV, 30-39 MIN: ICD-10-PCS | Mod: S$PBB,,, | Performed by: NURSE PRACTITIONER

## 2023-12-04 PROCEDURE — 90686 IIV4 VACC NO PRSV 0.5 ML IM: CPT | Mod: PBBFAC

## 2023-12-04 RX ORDER — BICTEGRAVIR SODIUM, EMTRICITABINE, AND TENOFOVIR ALAFENAMIDE FUMARATE 50; 200; 25 MG/1; MG/1; MG/1
1 TABLET ORAL DAILY
Qty: 90 TABLET | Refills: 1 | Status: SHIPPED | OUTPATIENT
Start: 2023-12-04

## 2023-12-04 RX ORDER — LISINOPRIL 10 MG/1
10 TABLET ORAL DAILY
COMMUNITY

## 2023-12-04 RX ORDER — ERGOCALCIFEROL 1.25 MG/1
50000 CAPSULE ORAL
Qty: 12 CAPSULE | Refills: 1 | Status: SHIPPED | OUTPATIENT
Start: 2023-12-04

## 2023-12-04 RX ADMIN — INFLUENZA VIRUS VACCINE 0.5 ML: 15; 15; 15; 15 SUSPENSION INTRAMUSCULAR at 09:12

## 2023-12-04 NOTE — PROGRESS NOTES
Inlfuenza Vaccination given IM right deltoid using aseptic tech. Patient tolerated well. To contact clinic prn.

## 2023-12-04 NOTE — PROGRESS NOTES
Patient ID: Yoan Powell 62 y.o.     Chief Complaint:   Chief Complaint   Patient presents with    Followup HIV     Denies problems        HPI:    12/4/23  Zackary is a 63 yo WM presenting today for  HIV f/u visit.  He is taking Biktarvy daily with viral suppression. Labs collected 12/1/23 VL & CD4 count pending. He is feeling well today, appreciates refills. He tells me that he has lost 2 best friends & his psychiatrist to cancer in the past 6 months. He has not yet had opportunity to f/u on recommendations from last visit, but appreciates list to get items accomplished. He has seen Dr. Tyler for  medication refills, was started on lisinopril and EKG is requested by Dr. Tyler as well.  Amenable to flu vaccine today. Up to date with dental & eye exams. Doing well overall. All questions answered & concerns addressed.    4/24/23  Zackary is a 63 yo WM here today for HIV f/u visit.  He takes Biktarvy daily & is virally suppressed. Labs collected 4/20/23 VL UD, Cd4 599.  He takes Vitamin D most weeks, but does forget occasionally.  Level increased but not quite at goal yet. He has forgotten to get Shingrix & COVID vaccinations and to call Dr. Coleman's office for repeat colonoscopy as discussed last visit.  Reminded pt to do so, written reminder provided. Voiced appreciation.  Not sexually active for past 2 + years. Declines need for STI screening swabs at this juncture.  He has recently started to date someone for the past 2 weeks, not physically intimate at this juncture.  All questions answered & concerns addressed.     9/21/22  Zackary is a 62 yo WM presenting today for HIV f/u visit. He is taking Biktarvy daily without any noted side effects.  Labs collected 9/8/22 VL UD, CD4 780.  He has been evaluated by [facility] and will now be followed annually. He is taking vitamin d weekly as prescribed, will check level again next labs.  He appreciates flu vax today. Will get Shingrix vaccine at local pharmacy  due to insurance restrictions. Will also seek variant specific COVID booster, available at [facility] vaccination site on [facility]. Denies any sexual activity since last STI screenings.  Due for repeat colonoscopy, will reach out to Dr. Coleman for same.  Routine eye exam UTD, attended visit within past 6 months. All questions answered & concerns addressed.            Past Medical History:   Diagnosis Date    ADHD (attention deficit hyperactivity disorder)     Arthritis     Depression     Diabetes mellitus, type 2     GERD (gastroesophageal reflux disease)     HIV infection     Hypertension     Personal history of colonic polyps 03/13/2019    Sleep apnea, unspecified     Vitamin D deficiency         Past Surgical History:   Procedure Laterality Date    CHOLECYSTECTOMY      COLONOSCOPY  07/26/2018    COLONOSCOPY  03/13/2019    ESOPHAGOGASTRODUODENOSCOPY  07/26/2018    gastric sleeve          Social History     Socioeconomic History    Marital status: Single   Tobacco Use    Smoking status: Former     Types: Cigarettes    Smokeless tobacco: Never   Substance and Sexual Activity    Alcohol use: Yes     Comment: occassionally    Drug use: Not Currently    Sexual activity: Yes     Partners: Male     Birth control/protection: Condom        Family History   Problem Relation Age of Onset    Breast cancer Father         Review of patient's allergies indicates:  No Known Allergies     Immunization History   Administered Date(s) Administered    COVID-19, MRNA, LN-S, PF (Pfizer) (Purple Cap) 03/03/2021, 03/25/2021    Hepatitis A 01/06/2014, 07/08/2014    Hepatitis A, Adult 01/06/2014, 07/08/2014    Hepatitis B, Adult 10/24/2013    Influenza - Quadrivalent 10/15/2015, 10/12/2016, 02/11/2021, 02/14/2022    Influenza - Quadrivalent - PF (6-35 months) 09/30/2019    Influenza - Quadrivalent - PF *Preferred* (6 months and older) 11/20/2017, 10/31/2018, 02/11/2021, 02/14/2022, 09/21/2022, 12/04/2023    Influenza - Trivalent  "- PF (ADULT) 11/20/2013, 11/13/2014, 11/20/2017    Meningococcal Conjugate (MCV4P) 07/23/2018, 01/28/2019    Pneumococcal Conjugate - 13 Valent 11/13/2014    Pneumococcal Polysaccharide - 23 Valent 11/27/2013, 01/28/2019    Tdap 02/05/2015        Review of Systems   Constitutional: Negative.    HENT: Negative.     Eyes: Negative.    Respiratory: Negative.     Cardiovascular: Negative.    Gastrointestinal: Negative.    Genitourinary: Negative.    Musculoskeletal: Negative.    Skin: Negative.    Neurological: Negative.    Endo/Heme/Allergies: Negative.    Psychiatric/Behavioral: Negative.     All other systems reviewed and are negative.         Objective:      /80 (BP Location: Right arm, Patient Position: Sitting, BP Method: Large (Automatic))   Pulse 79   Temp 98.3 °F (36.8 °C) (Oral)   Resp 20   Ht 5' 8" (1.727 m)   Wt 114.3 kg (252 lb)   BMI 38.32 kg/m²      Physical Exam  Vitals reviewed.   Constitutional:       General: He is not in acute distress.     Appearance: Normal appearance. He is not toxic-appearing.   HENT:      Mouth/Throat:      Mouth: Mucous membranes are moist.      Pharynx: Oropharynx is clear.   Eyes:      Conjunctiva/sclera: Conjunctivae normal.   Cardiovascular:      Rate and Rhythm: Normal rate and regular rhythm.   Pulmonary:      Effort: Pulmonary effort is normal. No respiratory distress.      Breath sounds: Normal breath sounds.   Abdominal:      General: Abdomen is flat. Bowel sounds are normal.      Palpations: Abdomen is soft.   Musculoskeletal:         General: Normal range of motion.      Cervical back: Normal range of motion.   Lymphadenopathy:      Cervical: No cervical adenopathy.   Skin:     General: Skin is warm and dry.   Neurological:      General: No focal deficit present.      Mental Status: He is alert and oriented to person, place, and time. Mental status is at baseline.   Psychiatric:         Mood and Affect: Mood normal.         Behavior: Behavior normal.      "     Labs:   Lab Results   Component Value Date    WBC 4.67 12/01/2023    HGB 15.9 12/01/2023    HCT 46.2 12/01/2023    MCV 96.0 (H) 12/01/2023     12/01/2023       CMP  Sodium Level   Date Value Ref Range Status   12/01/2023 139 136 - 145 mmol/L Final     Potassium Level   Date Value Ref Range Status   12/01/2023 4.2 3.5 - 5.1 mmol/L Final     Carbon Dioxide   Date Value Ref Range Status   12/01/2023 26 23 - 31 mmol/L Final     Blood Urea Nitrogen   Date Value Ref Range Status   12/01/2023 17.2 8.4 - 25.7 mg/dL Final     Creatinine   Date Value Ref Range Status   12/01/2023 0.95 0.73 - 1.18 mg/dL Final     Calcium Level Total   Date Value Ref Range Status   12/01/2023 9.8 8.8 - 10.0 mg/dL Final     Albumin Level   Date Value Ref Range Status   12/01/2023 4.3 3.4 - 4.8 g/dL Final     Bilirubin Total   Date Value Ref Range Status   12/01/2023 1.0 <=1.5 mg/dL Final     Alkaline Phosphatase   Date Value Ref Range Status   12/01/2023 81 40 - 150 unit/L Final     Aspartate Aminotransferase   Date Value Ref Range Status   12/01/2023 27 5 - 34 unit/L Final     Alanine Aminotransferase   Date Value Ref Range Status   12/01/2023 44 0 - 55 unit/L Final     eGFR   Date Value Ref Range Status   12/01/2023 >60 mls/min/1.73/m2 Final     Lab Results   Component Value Date    TSH 2.149 04/20/2023     Hep C Ab Interp   Date Value Ref Range Status   04/20/2023 Nonreactive Nonreactive Final     Syphilis Antibody   Date Value Ref Range Status   04/20/2023 Reactive (A) Nonreactive, Equivocal Final     RPR   Date Value Ref Range Status   04/20/2023 Non-Reactive Non-Reactive Final     Cholesterol Total   Date Value Ref Range Status   04/20/2023 178 <=200 mg/dL Final     HDL Cholesterol   Date Value Ref Range Status   04/20/2023 46 35 - 60 mg/dL Final     Triglyceride   Date Value Ref Range Status   04/20/2023 164 (H) 34 - 140 mg/dL Final     Cholesterol/HDL Ratio   Date Value Ref Range Status   04/20/2023 4 0 - 5 Final     Very Low  Density Lipoprotein   Date Value Ref Range Status   04/20/2023 33  Final     LDL Cholesterol   Date Value Ref Range Status   04/20/2023 99.00 50.00 - 140.00 mg/dL Final     Vit D 25 OH   Date Value Ref Range Status   04/20/2023 26.5 (L) 30.0 - 80.0 ng/mL Final     Results for orders placed or performed in visit on 09/08/22   CD4 Lymphocytes   Result Value Ref Range    Patient Age 61     WBC Absolute 4,700 4,500 - 11,500 /mm3    Lymph Percent 45 28 - 48 %    Lymph Absolute 2,115 1,260 - 5,520 x10(3)/mcL    CD4 % 36.9 %    CD4 Absolute 780.435 589 - 1,505 unit/L    T Cell Interp       Normal total lymphocyte absolute count and normal percentage.  Normal CD4+ T helper cell absolute count and normal percentage.  Bonifacio Camacho MD       Results for orders placed or performed in visit on 04/20/23   HIV-1 RNA, Quantitative, PCR with Reflex to Genotype   Result Value Ref Range    HIV-1 RNA Detect/Quant, P Undetected Undetected copies/mL     Results for orders placed or performed in visit on 04/20/23   Quantiferon Gold TB   Result Value Ref Range    QuantiFERON-Tb Gold Plus Result Negative Negative    TB1 Ag minus Nil Result -0.06 IU/mL    TB2 Ag minus Nil Result -0.05 IU/mL    Mitogen minus Nil Result 9.83 IU/mL    Nil Result 0.17 IU/mL     No results found for this or any previous visit.  No results found for this or any previous visit.    Imaging: Reviewed most recent relevant imaging studies available, notable results highlighted in this note    Medications:     Current Outpatient Medications   Medication Instructions    BIKTARVY -25 mg (25 kg or greater) 1 tablet, Oral, Daily    dextroamphetamine-amphetamine (ADDERALL) 20 mg tablet 1 tablet, Oral, 3 times daily    ergocalciferol (ERGOCALCIFEROL) 50,000 Units, Oral, Every 7 days    lisinopriL 10 mg, Oral, Daily    TRINTELLIX 20 mg Tab 1 tablet, Oral, Daily    VRAYLAR 3 mg Cap Oral       Assessment:       Problem List Items Addressed This Visit    None  Visit  Diagnoses       HIV disease    -  Primary    Relevant Medications    BIKTARVY -25 mg (25 kg or greater)    Other Relevant Orders    Hepatitis B Surface Ab, Qualitative    Quantiferon Gold TB    Hepatitis C Antibody    SYPHILIS ANTIBODY (WITH REFLEX RPR)    Urinalysis    Comprehensive Metabolic Panel    CBC Auto Differential    CD4 Lymphocytes    HIV-1 RNA, Quantitative, PCR with Reflex to Genotype    Hepatitis A antibody, IgG    Chlamydia/GC, PCR    Need for vaccination        Relevant Medications    influenza (QUADRIVALENT PF) vaccine 0.5 mL (Completed)    Primary hypertension        Relevant Orders    IN OFFICE EKG 12-LEAD (to Muse)    Hemoglobin A1C    TSH    Lipid Panel    Vitamin D deficiency        Relevant Medications    ergocalciferol (ERGOCALCIFEROL) 50,000 unit Cap    Other Relevant Orders    Vitamin D    Abnormal finding of blood chemistry, unspecified        Relevant Orders    Hemoglobin A1C               Plan:      HIV disease  -     BIKTARVY -25 mg (25 kg or greater); Take 1 tablet by mouth once daily.  Dispense: 90 tablet; Refill: 1  -     Hepatitis B Surface Ab, Qualitative; Future; Expected date: 05/01/2024  -     Quantiferon Gold TB; Future; Expected date: 05/01/2024  -     Hepatitis C Antibody; Future; Expected date: 05/01/2024  -     SYPHILIS ANTIBODY (WITH REFLEX RPR); Future; Expected date: 05/01/2024  -     Urinalysis; Future; Expected date: 05/01/2024  -     Comprehensive Metabolic Panel; Future; Expected date: 05/01/2024  -     CBC Auto Differential; Future; Expected date: 05/01/2024  -     CD4 Lymphocytes; Future; Expected date: 05/01/2024  -     HIV-1 RNA, Quantitative, PCR with Reflex to Genotype; Future; Expected date: 05/01/2024  -     Hepatitis A antibody, IgG; Future; Expected date: 05/01/2024  -     Chlamydia/GC, PCR; Future; Expected date: 05/01/2024  Adherence and sexual health counseling done.  Use condoms for all sexual encounters.  Blood precautions.   Continue  Biktarvy as prescribed.  Labs prior to next visit.   RTC 6 months with Paula, prefers in clinic visits.     HIV Wellness:  Anal pap: HRA 8/22 with annual f/u at [facility], 8/23  Oral CT/GC: 5/20 Neg  Anal CT/GC: 5/20 Neg  Urine CT/GC: 2/22 Neg  RPR: 2/22 serofast 1:2 titer, 4/23 NR  Ophth: 2022  DEXA:  Colon Cancer Screen:  Dr. Coleman colonoscopy     Need for vaccination  -     influenza (QUADRIVALENT PF) vaccine 0.5 mL  Flu vaccine today.   Will get COVID booster & Shingrix at local pharmacy.    Primary hypertension  -     IN OFFICE EKG 12-LEAD (to Muse)  -     Hemoglobin A1C; Future; Expected date: 05/01/2024  -     TSH; Future; Expected date: 05/01/2024  -     Lipid Panel; Future; Expected date: 05/01/2024  EKG today as requested by Dr. Tyler.  Will send results to his office.     Vitamin D deficiency  -     ergocalciferol (ERGOCALCIFEROL) 50,000 unit Cap; Take 1 capsule (50,000 Units total) by mouth every 7 days.  Dispense: 12 capsule; Refill: 1  -     Vitamin D; Future; Expected date: 05/01/2024  Continue Ergocalciferol weekly as prescribed.   Check level next labs.     Abnormal finding of blood chemistry, unspecified  -     Hemoglobin A1C; Future; Expected date: 05/01/2024

## 2023-12-05 LAB — HIV1 RNA # PLAS NAA DL=20: NORMAL COPIES/ML

## 2023-12-06 LAB
AGE: 61
CD3+CD4+ CELLS # SPEC: 750 UNIT/L (ref 589–1505)
CD3+CD4+ CELLS NFR BLD: 35.7 %
LYMPHOCYTES # BLD AUTO: 2101.5 X10(3)/MCL (ref 1260–5520)
LYMPHOCYTES NFR LN MANUAL: 45 % (ref 28–48)
LYMPHOMA - T-CELL MARKERS SPEC-IMP: NORMAL
WBC # BLD AUTO: 4670 /MM3 (ref 4500–11500)

## 2023-12-07 NOTE — PROGRESS NOTES
This was ordered in clinic for primary care provider. EKG was sent to the primary care provider by ALBINO Siddiqui.

## 2024-06-04 ENCOUNTER — OFFICE VISIT (OUTPATIENT)
Dept: INFECTIOUS DISEASES | Facility: CLINIC | Age: 64
End: 2024-06-04
Payer: MEDICARE

## 2024-06-04 VITALS
BODY MASS INDEX: 37.1 KG/M2 | RESPIRATION RATE: 12 BRPM | WEIGHT: 244.81 LBS | DIASTOLIC BLOOD PRESSURE: 81 MMHG | HEART RATE: 80 BPM | SYSTOLIC BLOOD PRESSURE: 132 MMHG | HEIGHT: 68 IN | TEMPERATURE: 98 F

## 2024-06-04 DIAGNOSIS — E55.9 VITAMIN D DEFICIENCY: ICD-10-CM

## 2024-06-04 DIAGNOSIS — I10 PRIMARY HYPERTENSION: ICD-10-CM

## 2024-06-04 DIAGNOSIS — B20 HIV DISEASE: Primary | ICD-10-CM

## 2024-06-04 DIAGNOSIS — Z86.39 HISTORY OF DIABETES MELLITUS, TYPE II: ICD-10-CM

## 2024-06-04 DIAGNOSIS — F90.9 ATTENTION DEFICIT HYPERACTIVITY DISORDER (ADHD), UNSPECIFIED ADHD TYPE: ICD-10-CM

## 2024-06-04 PROCEDURE — 3079F DIAST BP 80-89 MM HG: CPT | Mod: CPTII,,, | Performed by: NURSE PRACTITIONER

## 2024-06-04 PROCEDURE — 1160F RVW MEDS BY RX/DR IN RCRD: CPT | Mod: CPTII,,, | Performed by: NURSE PRACTITIONER

## 2024-06-04 PROCEDURE — 4010F ACE/ARB THERAPY RXD/TAKEN: CPT | Mod: CPTII,,, | Performed by: NURSE PRACTITIONER

## 2024-06-04 PROCEDURE — 99214 OFFICE O/P EST MOD 30 MIN: CPT | Mod: S$PBB,,, | Performed by: NURSE PRACTITIONER

## 2024-06-04 PROCEDURE — 1159F MED LIST DOCD IN RCRD: CPT | Mod: CPTII,,, | Performed by: NURSE PRACTITIONER

## 2024-06-04 PROCEDURE — 99213 OFFICE O/P EST LOW 20 MIN: CPT | Mod: PBBFAC | Performed by: NURSE PRACTITIONER

## 2024-06-04 PROCEDURE — 3075F SYST BP GE 130 - 139MM HG: CPT | Mod: CPTII,,, | Performed by: NURSE PRACTITIONER

## 2024-06-04 PROCEDURE — 3008F BODY MASS INDEX DOCD: CPT | Mod: CPTII,,, | Performed by: NURSE PRACTITIONER

## 2024-06-04 RX ORDER — ERGOCALCIFEROL 1.25 MG/1
50000 CAPSULE ORAL
Qty: 12 CAPSULE | Refills: 1 | Status: SHIPPED | OUTPATIENT
Start: 2024-06-04

## 2024-06-04 RX ORDER — LISINOPRIL 10 MG/1
10 TABLET ORAL DAILY
Qty: 90 TABLET | Refills: 3 | Status: SHIPPED | OUTPATIENT
Start: 2024-06-04

## 2024-06-04 RX ORDER — BICTEGRAVIR SODIUM, EMTRICITABINE, AND TENOFOVIR ALAFENAMIDE FUMARATE 50; 200; 25 MG/1; MG/1; MG/1
1 TABLET ORAL DAILY
Qty: 90 TABLET | Refills: 1 | Status: SHIPPED | OUTPATIENT
Start: 2024-06-04

## 2024-06-04 NOTE — PROGRESS NOTES
Patient ID: Yoan Powell 63 y.o.     Chief Complaint:   Chief Complaint   Patient presents with    Follow-up HIV     Denies problems        HPI:    6/4/24  Zackary is a 64 yo WM here today for HIV f/u visit.  He takes Biktarvy daily and is virally suppressed. Labs 12/1/23 VL UD, Cd4 750. Will go to [facility] this week for updated labs. Not sexually active, declines need for STI screening swabs today. He is taking vitamin d weekly as prescribed, tolerates well. Will call Dr Coleman for f/u colonoscopy, but has been very busy caring for aging mother. She is becoming very forgetful, so he is preparing all of her meals & driving her to appointments. Stressed importance of self care as well, and seeking assistance when needed. Voiced understanding. All questions answered & concerns addressed.    12/4/23  Zackary is a 61 yo WM presenting today for  HIV f/u visit.  He is taking Biktarvy daily with viral suppression. Labs collected 12/1/23 VL & CD4 count pending. He is feeling well today, appreciates refills. He tells me that he has lost 2 best friends & his psychiatrist to cancer in the past 6 months. He has not yet had opportunity to f/u on recommendations from last visit, but appreciates list to get items accomplished. He has seen Dr. Tyler for  medication refills, was started on lisinopril and EKG is requested by Dr. Tyler as well.  Amenable to flu vaccine today. Up to date with dental & eye exams. Doing well overall. All questions answered & concerns addressed.     4/24/23  Zackary is a 61 yo WM here today for HIV f/u visit.  He takes Biktarvy daily & is virally suppressed. Labs collected 4/20/23 VL UD, Cd4 599.  He takes Vitamin D most weeks, but does forget occasionally.  Level increased but not quite at goal yet. He has forgotten to get Shingrix & COVID vaccinations and to call Dr. Coleman's office for repeat colonoscopy as discussed last visit.  Reminded pt to do so, written reminder provided.  Voiced appreciation.  Not sexually active for past 2 + years. Declines need for STI screening swabs at this juncture.  He has recently started to date someone for the past 2 weeks, not physically intimate at this juncture.  All questions answered & concerns addressed.           Past Medical History:   Diagnosis Date    ADHD (attention deficit hyperactivity disorder)     Arthritis     Depression     Diabetes mellitus, type 2     GERD (gastroesophageal reflux disease)     HIV infection     Hypertension     Personal history of colonic polyps 03/13/2019    Sleep apnea, unspecified     Vitamin D deficiency         Past Surgical History:   Procedure Laterality Date    CHOLECYSTECTOMY      COLONOSCOPY  07/26/2018    COLONOSCOPY  03/13/2019    ESOPHAGOGASTRODUODENOSCOPY  07/26/2018    gastric sleeve          Social History     Socioeconomic History    Marital status: Single   Tobacco Use    Smoking status: Former     Types: Cigarettes    Smokeless tobacco: Never   Substance and Sexual Activity    Alcohol use: Yes     Comment: occassionally    Drug use: Not Currently    Sexual activity: Yes     Partners: Male     Birth control/protection: Condom        Family History   Problem Relation Name Age of Onset    Breast cancer Father          Review of patient's allergies indicates:  No Known Allergies     Immunization History   Administered Date(s) Administered    COVID-19, MRNA, LN-S, PF (Pfizer) (Purple Cap) 03/03/2021, 03/25/2021    Hepatitis A 01/06/2014, 07/08/2014    Hepatitis A, Adult 01/06/2014, 07/08/2014    Hepatitis B, Adult 10/24/2013    Influenza 11/20/2013    Influenza - Quadrivalent 10/15/2015, 10/12/2016, 02/11/2021, 02/14/2022    Influenza - Quadrivalent - PF (6-35 months) 09/30/2019    Influenza - Quadrivalent - PF *Preferred* (6 months and older) 11/20/2017, 10/31/2018, 02/11/2021, 02/14/2022, 09/21/2022, 12/04/2023    Influenza - Trivalent - PF (ADULT) 11/20/2013, 11/13/2014, 11/20/2017    Meningococcal  "Conjugate (MCV4P) 07/23/2018, 01/28/2019    PPD Test 01/06/2014    Pneumococcal Conjugate - 13 Valent 11/13/2014    Pneumococcal Polysaccharide - 23 Valent 11/27/2013, 01/28/2019    Tdap 02/05/2015        Review of Systems   Constitutional: Negative.    HENT: Negative.     Eyes: Negative.    Respiratory: Negative.     Cardiovascular: Negative.    Gastrointestinal: Negative.    Genitourinary: Negative.    Musculoskeletal: Negative.    Skin: Negative.    Neurological: Negative.    Endo/Heme/Allergies: Negative.    Psychiatric/Behavioral: Negative.     All other systems reviewed and are negative.         Objective:      /81 (BP Location: Right arm, Patient Position: Sitting, BP Method: Medium (Automatic))   Pulse 80   Temp 98.1 °F (36.7 °C) (Oral)   Resp 12   Ht 5' 8" (1.727 m)   Wt 111 kg (244 lb 13.1 oz)   BMI 37.22 kg/m²      Physical Exam  Vitals reviewed.   Constitutional:       General: He is not in acute distress.     Appearance: Normal appearance. He is not toxic-appearing.   HENT:      Mouth/Throat:      Mouth: Mucous membranes are moist.      Pharynx: Oropharynx is clear.   Eyes:      Conjunctiva/sclera: Conjunctivae normal.   Cardiovascular:      Rate and Rhythm: Normal rate and regular rhythm.   Pulmonary:      Effort: Pulmonary effort is normal. No respiratory distress.      Breath sounds: Normal breath sounds.   Abdominal:      General: Abdomen is flat. Bowel sounds are normal.      Palpations: Abdomen is soft.   Musculoskeletal:         General: Normal range of motion.      Cervical back: Normal range of motion.   Lymphadenopathy:      Cervical: No cervical adenopathy.   Skin:     General: Skin is warm and dry.   Neurological:      General: No focal deficit present.      Mental Status: He is alert and oriented to person, place, and time. Mental status is at baseline.   Psychiatric:         Mood and Affect: Mood normal.         Behavior: Behavior normal.          Labs:   Lab Results "   Component Value Date    WBC 4.67 12/01/2023    HGB 15.9 12/01/2023    HCT 46.2 12/01/2023    MCV 96.0 (H) 12/01/2023     12/01/2023       CMP  Sodium   Date Value Ref Range Status   12/01/2023 139 136 - 145 mmol/L Final     Potassium   Date Value Ref Range Status   12/01/2023 4.2 3.5 - 5.1 mmol/L Final     Chloride   Date Value Ref Range Status   12/01/2023 105 98 - 107 mmol/L Final     CO2   Date Value Ref Range Status   12/01/2023 26 23 - 31 mmol/L Final     Blood Urea Nitrogen   Date Value Ref Range Status   12/01/2023 17.2 8.4 - 25.7 mg/dL Final     Creatinine   Date Value Ref Range Status   12/01/2023 0.95 0.73 - 1.18 mg/dL Final     Calcium   Date Value Ref Range Status   12/01/2023 9.8 8.8 - 10.0 mg/dL Final     Albumin   Date Value Ref Range Status   12/01/2023 4.3 3.4 - 4.8 g/dL Final     Bilirubin Total   Date Value Ref Range Status   12/01/2023 1.0 <=1.5 mg/dL Final     ALP   Date Value Ref Range Status   12/01/2023 81 40 - 150 unit/L Final     AST   Date Value Ref Range Status   12/01/2023 27 5 - 34 unit/L Final     ALT   Date Value Ref Range Status   12/01/2023 44 0 - 55 unit/L Final     eGFR   Date Value Ref Range Status   12/01/2023 >60 mls/min/1.73/m2 Final     Lab Results   Component Value Date    TSH 2.149 04/20/2023     Hep C Ab Interp   Date Value Ref Range Status   04/20/2023 Nonreactive Nonreactive Final     Syphilis Antibody   Date Value Ref Range Status   04/20/2023 Reactive (A) Nonreactive, Equivocal Final     RPR   Date Value Ref Range Status   04/20/2023 Non-Reactive Non-Reactive Final     Cholesterol Total   Date Value Ref Range Status   04/20/2023 178 <=200 mg/dL Final     HDL Cholesterol   Date Value Ref Range Status   04/20/2023 46 35 - 60 mg/dL Final     Triglyceride   Date Value Ref Range Status   04/20/2023 164 (H) 34 - 140 mg/dL Final     Cholesterol/HDL Ratio   Date Value Ref Range Status   04/20/2023 4 0 - 5 Final     Very Low Density Lipoprotein   Date Value Ref Range  Status   04/20/2023 33  Final     LDL Cholesterol   Date Value Ref Range Status   04/20/2023 99.00 50.00 - 140.00 mg/dL Final     Vitamin D   Date Value Ref Range Status   04/20/2023 26.5 (L) 30.0 - 80.0 ng/mL Final     Results for orders placed or performed in visit on 12/01/23   CD4 Lymphocytes   Result Value Ref Range    Patient Age 62     WBC Absolute 4,670 4,500 - 11,500 /mm3    Lymph Percent 45 28 - 48 %    Lymph Absolute 2,101.5 1,260 - 5,520 x10(3)/mcL    CD4 % 35.7 %    CD4 Absolute 750 589 - 1,505 unit/L    T Cell Interp       Normal absolute lymphocyte count with normal absolute CD4+ lymphocyte count.    Gagandeep Stevens M.D.     Narrative    This test was developed and its performance characteristics determined by [facility]. It has not been cleared or approved by the [place] Food and Drug Administration. The FDA does not require this test to go through premarket FDA review. This test is used for clinical purposes. It should not be regarded as investigational or for research. This laboratory is certified under the Clinical Laboratory Improvement Amendments (CLIA) as qualified to perform high complexity clinical laboratory testing.     Results for orders placed or performed in visit on 12/01/23   HIV-1 RNA, Quantitative, PCR with Reflex to Genotype   Result Value Ref Range    HIV-1 RNA Detect/Quant, P Undetected Undetected copies/mL     Results for orders placed or performed in visit on 04/20/23   Quantiferon Gold TB   Result Value Ref Range    QuantiFERON-Tb Gold Plus Result Negative Negative    TB1 Ag minus Nil Result -0.06 IU/mL    TB2 Ag minus Nil Result -0.05 IU/mL    Mitogen minus Nil Result 9.83 IU/mL    Nil Result 0.17 IU/mL     No results found for this or any previous visit.  No results found for this or any previous visit.    Imaging: Reviewed most recent relevant imaging studies available, notable results highlighted in this note    Medications:     Current  Outpatient Medications   Medication Instructions    BIKTARVY -25 mg (25 kg or greater) 1 tablet, Oral, Daily    dextroamphetamine-amphetamine (ADDERALL) 20 mg tablet 1 tablet, Oral, 3 times daily    ergocalciferol (ERGOCALCIFEROL) 50,000 Units, Oral, Every 7 days    lisinopriL 10 mg, Oral, Daily    TRINTELLIX 20 mg Tab 1 tablet, Oral, Daily    VRAYLAR 3 mg Cap Oral       Assessment:       Problem List Items Addressed This Visit    None  Visit Diagnoses       HIV disease    -  Primary    Relevant Medications    BIKTARVY -25 mg (25 kg or greater)    Other Relevant Orders    Quantiferon Gold TB    Hepatitis C Antibody    SYPHILIS ANTIBODY (WITH REFLEX RPR)    Chlamydia/GC, PCR    Urinalysis    Comprehensive Metabolic Panel    CBC Auto Differential    CD4 Lymphocytes    HIV-1 RNA, Quantitative, PCR with Reflex to Genotype    Hepatitis A antibody, IgG    Hepatitis B Surface Ab, Qualitative    Primary hypertension        Relevant Medications    lisinopriL 10 MG tablet    Other Relevant Orders    TSH    Lipid Panel    Vitamin D deficiency        Relevant Medications    ergocalciferol (ERGOCALCIFEROL) 50,000 unit Cap    Other Relevant Orders    Vitamin D    History of diabetes mellitus, type II        Relevant Orders    Hemoglobin A1C    Attention deficit hyperactivity disorder (ADHD), unspecified ADHD type        Relevant Orders    TSH               Plan:      HIV disease  -     BIKTARVY -25 mg (25 kg or greater); Take 1 tablet by mouth once daily.  Dispense: 90 tablet; Refill: 1  -     Quantiferon Gold TB; Future; Expected date: 06/04/2024  -     Hepatitis C Antibody; Future; Expected date: 06/04/2024  -     SYPHILIS ANTIBODY (WITH REFLEX RPR); Future; Expected date: 06/04/2024  -     Chlamydia/GC, PCR; Future; Expected date: 06/04/2024  -     Urinalysis; Future; Expected date: 06/04/2024  -     Comprehensive Metabolic Panel; Future; Expected date: 06/04/2024  -     CBC Auto Differential; Future;  Expected date: 06/04/2024  -     CD4 Lymphocytes; Future; Expected date: 06/04/2024  -     HIV-1 RNA, Quantitative, PCR with Reflex to Genotype; Future; Expected date: 06/04/2024  -     Hepatitis A antibody, IgG; Future; Expected date: 06/04/2024  -     Hepatitis B Surface Ab, Qualitative; Future; Expected date: 06/04/2024    Primary hypertension  -     TSH; Future; Expected date: 06/04/2024  -     Lipid Panel; Future; Expected date: 06/04/2024  -     lisinopriL 10 MG tablet; Take 1 tablet (10 mg total) by mouth once daily.  Dispense: 90 tablet; Refill: 3  Adherence and sexual health counseling done.  Use condoms for all sexual encounters.  Blood precautions.   Continue Biktarvy as prescribed.  Labs within 1 week and prior to next visit.   RTC 6 months with Paula, prefers in clinic visits.     HIV Wellness:  Anal pap: [facility] 8/22 with annual f[facility]u at [facility], 8/23  Oral CT/GC: 5/20 Neg  Anal CT/GC: 5/20 Neg  Urine CT/GC: 2/22 Neg, 6/24  RPR: 2/22 serofast 1:2 titer, 4/23 NR, 6/24  Ophth: 2022  DEXA:  Colon Cancer Screen:  Dr. Coleman colonoscopy    Vitamin D deficiency  -     Vitamin D; Future; Expected date: 06/04/2024  -     ergocalciferol (ERGOCALCIFEROL) 50,000 unit Cap; Take 1 capsule (50,000 Units total) by mouth every 7 days.  Dispense: 12 capsule; Refill: 1  Continue Ergocalciferol weekly as prescribed.   Check level today.     History of diabetes mellitus, type II  -     Hemoglobin A1C; Future; Expected date: 06/04/2024  Check HgbA1c today.     Attention deficit hyperactivity disorder (ADHD), unspecified ADHD type  -     TSH; Future; Expected date: 06/04/2024

## 2024-06-13 ENCOUNTER — LAB VISIT (OUTPATIENT)
Dept: LAB | Facility: HOSPITAL | Age: 64
End: 2024-06-13
Attending: NURSE PRACTITIONER
Payer: MEDICARE

## 2024-06-13 DIAGNOSIS — E55.9 VITAMIN D DEFICIENCY: ICD-10-CM

## 2024-06-13 DIAGNOSIS — Z86.39 HISTORY OF DIABETES MELLITUS, TYPE II: ICD-10-CM

## 2024-06-13 DIAGNOSIS — B20 HIV DISEASE: ICD-10-CM

## 2024-06-13 DIAGNOSIS — I10 PRIMARY HYPERTENSION: ICD-10-CM

## 2024-06-13 DIAGNOSIS — F90.9 ATTENTION DEFICIT HYPERACTIVITY DISORDER (ADHD), UNSPECIFIED ADHD TYPE: ICD-10-CM

## 2024-06-13 LAB
25(OH)D3+25(OH)D2 SERPL-MCNC: 22 NG/ML (ref 30–80)
ALBUMIN SERPL-MCNC: 4.1 G/DL (ref 3.4–4.8)
ALBUMIN/GLOB SERPL: 1.4 RATIO (ref 1.1–2)
ALP SERPL-CCNC: 77 UNIT/L (ref 40–150)
ALT SERPL-CCNC: 21 UNIT/L (ref 0–55)
ANION GAP SERPL CALC-SCNC: 6 MEQ/L
AST SERPL-CCNC: 16 UNIT/L (ref 5–34)
BACTERIA #/AREA URNS AUTO: ABNORMAL /HPF
BASOPHILS # BLD AUTO: 0.04 X10(3)/MCL
BASOPHILS NFR BLD AUTO: 0.9 %
BILIRUB SERPL-MCNC: 1 MG/DL
BILIRUB UR QL STRIP.AUTO: NEGATIVE
BUN SERPL-MCNC: 16.6 MG/DL (ref 8.4–25.7)
CALCIUM SERPL-MCNC: 9.5 MG/DL (ref 8.8–10)
CHLORIDE SERPL-SCNC: 105 MMOL/L (ref 98–107)
CHOLEST SERPL-MCNC: 186 MG/DL
CHOLEST/HDLC SERPL: 4 {RATIO} (ref 0–5)
CLARITY UR: CLEAR
CO2 SERPL-SCNC: 26 MMOL/L (ref 23–31)
COLOR UR AUTO: YELLOW
CREAT SERPL-MCNC: 0.93 MG/DL (ref 0.73–1.18)
CREAT/UREA NIT SERPL: 18
EOSINOPHIL # BLD AUTO: 0.08 X10(3)/MCL (ref 0–0.9)
EOSINOPHIL NFR BLD AUTO: 1.8 %
ERYTHROCYTE [DISTWIDTH] IN BLOOD BY AUTOMATED COUNT: 12.7 % (ref 11.5–17)
EST. AVERAGE GLUCOSE BLD GHB EST-MCNC: 105.4 MG/DL
GFR SERPLBLD CREATININE-BSD FMLA CKD-EPI: >60 ML/MIN/1.73/M2
GLOBULIN SER-MCNC: 3 GM/DL (ref 2.4–3.5)
GLUCOSE SERPL-MCNC: 107 MG/DL (ref 82–115)
GLUCOSE UR QL STRIP: NORMAL
HAV AB SER QL IA: NONREACTIVE
HBA1C MFR BLD: 5.3 %
HBV SURFACE AB SER-ACNC: 1955.38 MIU/ML
HBV SURFACE AB SERPL IA-ACNC: REACTIVE M[IU]/ML
HCT VFR BLD AUTO: 44.9 % (ref 42–52)
HCV AB SERPL QL IA: NONREACTIVE
HDLC SERPL-MCNC: 46 MG/DL (ref 35–60)
HGB BLD-MCNC: 15.4 G/DL (ref 14–18)
HGB UR QL STRIP: NEGATIVE
IMM GRANULOCYTES # BLD AUTO: 0.02 X10(3)/MCL (ref 0–0.04)
IMM GRANULOCYTES NFR BLD AUTO: 0.4 %
KETONES UR QL STRIP: NEGATIVE
LDLC SERPL CALC-MCNC: 102 MG/DL (ref 50–140)
LEUKOCYTE ESTERASE UR QL STRIP: NEGATIVE
LYMPHOCYTES # BLD AUTO: 2.01 X10(3)/MCL (ref 0.6–4.6)
LYMPHOCYTES NFR BLD AUTO: 44.2 %
MCH RBC QN AUTO: 32.6 PG (ref 27–31)
MCHC RBC AUTO-ENTMCNC: 34.3 G/DL (ref 33–36)
MCV RBC AUTO: 94.9 FL (ref 80–94)
MONOCYTES # BLD AUTO: 0.46 X10(3)/MCL (ref 0.1–1.3)
MONOCYTES NFR BLD AUTO: 10.1 %
MUCOUS THREADS URNS QL MICRO: ABNORMAL /LPF
NEUTROPHILS # BLD AUTO: 1.94 X10(3)/MCL (ref 2.1–9.2)
NEUTROPHILS NFR BLD AUTO: 42.6 %
NITRITE UR QL STRIP: NEGATIVE
NRBC BLD AUTO-RTO: 0 %
PH UR STRIP: 5.5 [PH]
PLATELET # BLD AUTO: 248 X10(3)/MCL (ref 130–400)
PMV BLD AUTO: 9.1 FL (ref 7.4–10.4)
POTASSIUM SERPL-SCNC: 4.1 MMOL/L (ref 3.5–5.1)
PROT SERPL-MCNC: 7.1 GM/DL (ref 5.8–7.6)
PROT UR QL STRIP: ABNORMAL
RBC # BLD AUTO: 4.73 X10(6)/MCL (ref 4.7–6.1)
RBC #/AREA URNS AUTO: ABNORMAL /HPF
RPR SER QL: NORMAL
SODIUM SERPL-SCNC: 137 MMOL/L (ref 136–145)
SP GR UR STRIP.AUTO: 1.03 (ref 1–1.03)
SQUAMOUS #/AREA URNS LPF: ABNORMAL /HPF
T PALLIDUM AB SER QL: REACTIVE
TRIGL SERPL-MCNC: 190 MG/DL (ref 34–140)
TSH SERPL-ACNC: 1.9 UIU/ML (ref 0.35–4.94)
UROBILINOGEN UR STRIP-ACNC: NORMAL
VLDLC SERPL CALC-MCNC: 38 MG/DL
WBC # SPEC AUTO: 4.55 X10(3)/MCL (ref 4.5–11.5)
WBC #/AREA URNS AUTO: ABNORMAL /HPF

## 2024-06-13 PROCEDURE — 87536 HIV-1 QUANT&REVRSE TRNSCRPJ: CPT

## 2024-06-13 PROCEDURE — 86780 TREPONEMA PALLIDUM: CPT

## 2024-06-13 PROCEDURE — 85025 COMPLETE CBC W/AUTO DIFF WBC: CPT

## 2024-06-13 PROCEDURE — 83036 HEMOGLOBIN GLYCOSYLATED A1C: CPT

## 2024-06-13 PROCEDURE — 36415 COLL VENOUS BLD VENIPUNCTURE: CPT

## 2024-06-13 PROCEDURE — 86803 HEPATITIS C AB TEST: CPT

## 2024-06-13 PROCEDURE — 86706 HEP B SURFACE ANTIBODY: CPT

## 2024-06-13 PROCEDURE — 80053 COMPREHEN METABOLIC PANEL: CPT

## 2024-06-13 PROCEDURE — 80061 LIPID PANEL: CPT

## 2024-06-13 PROCEDURE — 82306 VITAMIN D 25 HYDROXY: CPT

## 2024-06-13 PROCEDURE — 81001 URINALYSIS AUTO W/SCOPE: CPT

## 2024-06-13 PROCEDURE — 86361 T CELL ABSOLUTE COUNT: CPT

## 2024-06-13 PROCEDURE — 86480 TB TEST CELL IMMUN MEASURE: CPT

## 2024-06-13 PROCEDURE — 84443 ASSAY THYROID STIM HORMONE: CPT

## 2024-06-13 PROCEDURE — 86708 HEPATITIS A ANTIBODY: CPT

## 2024-06-13 PROCEDURE — 86592 SYPHILIS TEST NON-TREP QUAL: CPT

## 2024-06-14 LAB
AGE: 62
CD3+CD4+ CELLS # SPEC: 631 UNIT/L (ref 589–1505)
CD3+CD4+ CELLS NFR BLD: 31.5 %
HIV1 RNA # PLAS NAA DL=20: NORMAL COPIES/ML
LYMPHOCYTES # BLD AUTO: 2002 X10(3)/MCL (ref 1260–5520)
LYMPHOCYTES NFR LN MANUAL: 44 % (ref 28–48)
LYMPHOMA - T-CELL MARKERS SPEC-IMP: NORMAL
WBC # BLD AUTO: 4550 /MM3 (ref 4500–11500)

## 2024-06-15 LAB — T PALLIDUM AB SER QL AGGL: POSITIVE

## 2024-06-17 LAB
GAMMA INTERFERON BACKGROUND BLD IA-ACNC: 0.05 IU/ML
M TB IFN-G BLD-IMP: NEGATIVE
M TB IFN-G CD4+ BCKGRND COR BLD-ACNC: 0.01 IU/ML
M TB IFN-G CD4+CD8+ BCKGRND COR BLD-ACNC: -0.01 IU/ML
MITOGEN IGNF BCKGRD COR BLD-ACNC: 8.24 IU/ML

## 2024-12-05 ENCOUNTER — OFFICE VISIT (OUTPATIENT)
Dept: INFECTIOUS DISEASES | Facility: CLINIC | Age: 64
End: 2024-12-05
Payer: MEDICARE

## 2024-12-05 VITALS
SYSTOLIC BLOOD PRESSURE: 123 MMHG | BODY MASS INDEX: 36.94 KG/M2 | HEART RATE: 94 BPM | DIASTOLIC BLOOD PRESSURE: 71 MMHG | WEIGHT: 243.75 LBS | HEIGHT: 68 IN | TEMPERATURE: 98 F | RESPIRATION RATE: 12 BRPM

## 2024-12-05 DIAGNOSIS — Z21 ASYMPTOMATIC HIV INFECTION, WITH NO HISTORY OF HIV-RELATED ILLNESS: Primary | ICD-10-CM

## 2024-12-05 DIAGNOSIS — E55.9 VITAMIN D DEFICIENCY: ICD-10-CM

## 2024-12-05 PROCEDURE — 99214 OFFICE O/P EST MOD 30 MIN: CPT | Mod: PBBFAC | Performed by: NURSE PRACTITIONER

## 2024-12-05 RX ORDER — DEXTROAMPHETAMINE SACCHARATE, AMPHETAMINE ASPARTATE, DEXTROAMPHETAMINE SULFATE AND AMPHETAMINE SULFATE 7.5; 7.5; 7.5; 7.5 MG/1; MG/1; MG/1; MG/1
1 TABLET ORAL 2 TIMES DAILY
COMMUNITY
Start: 2024-11-20

## 2024-12-05 RX ORDER — BICTEGRAVIR SODIUM, EMTRICITABINE, AND TENOFOVIR ALAFENAMIDE FUMARATE 50; 200; 25 MG/1; MG/1; MG/1
1 TABLET ORAL DAILY
Qty: 90 TABLET | Refills: 1 | Status: SHIPPED | OUTPATIENT
Start: 2024-12-05

## 2024-12-05 RX ORDER — ACETAMINOPHEN 500 MG
2000 TABLET ORAL DAILY
COMMUNITY
Start: 2024-12-05

## 2024-12-05 RX ORDER — VORTIOXETINE 10 MG/1
1 TABLET, FILM COATED ORAL
COMMUNITY
Start: 2024-06-14

## 2024-12-05 NOTE — PROGRESS NOTES
Patient ID: Yoan Powell 63 y.o.     Chief Complaint:   Chief Complaint   Patient presents with    Followup HIV     Denies problems        HPI:    12/5/24  Zackary is a 62 yo WM presenting today for HIV f/u visit.  He is virally suppressed on Biktarvy, tolerates it well. Labs 6/24 VL UD, CD4 631, Vitamin D 22. He is taking weekly vitamin d, but does miss doses periodically. Will revise to daily OTC vitamin D3 when current supply is completed. He tells me that he does have an appointment with [facility] for HRA. He is amenable to recommended vaccinations: flu, Hep A, and shingles. Will go to local pharmacy for this due to insurance coverage concerns. [place] [facility] [place]. Suffers from chronic pain for many years now. Will check with insurance regarding pain management providers for evaluation and treatment. All questions answered & concerns addressed.    6/4/24  Zackary is a 62 yo WM here today for HIV f/u visit.  He takes Biktarvy daily and is virally suppressed. Labs 12/1/23 VL UD, Cd4 750. Will go to [facility] this week for updated labs. Not sexually active, declines need for STI screening swabs today. He is taking vitamin d weekly as prescribed, tolerates well. Will call Dr Coleman for f/u colonoscopy, but has been very busy caring for aging mother. She is becoming very forgetful, so he is preparing all of her meals & driving her to appointments. Stressed importance of self care as well, and seeking assistance when needed. Voiced understanding. All questions answered & concerns addressed.     12/4/23  Zackary is a 61 yo WM presenting today for  HIV f/u visit.  He is taking Biktarvy daily with viral suppression. Labs collected 12/1/23 VL & CD4 count pending. He is feeling well today, appreciates refills. He tells me that he has lost 2 best friends & his psychiatrist to cancer in the past 6 months. He has not yet had opportunity to f/u on recommendations from last visit, but appreciates list  to get items accomplished. He has seen Dr. Tyler for MH medication refills, was started on lisinopril and EKG is requested by Dr. Tyler as well.  Amenable to flu vaccine today. Up to date with dental & eye exams. Doing well overall. All questions answered & concerns addressed.           Past Medical History:   Diagnosis Date    ADHD (attention deficit hyperactivity disorder)     Arthritis     Depression     Diabetes mellitus, type 2     GERD (gastroesophageal reflux disease)     HIV infection     Hypertension     Personal history of colonic polyps 2019    Sleep apnea, unspecified     Vitamin D deficiency         Past Surgical History:   Procedure Laterality Date    CHOLECYSTECTOMY      COLONOSCOPY  2018    COLONOSCOPY  2019    ESOPHAGOGASTRODUODENOSCOPY  2018    gastric sleeve          Social History     Socioeconomic History    Marital status: Single   Tobacco Use    Smoking status: Former     Current packs/day: 0.00     Types: Cigarettes     Quit date:      Years since quittin.9    Smokeless tobacco: Never   Substance and Sexual Activity    Alcohol use: Yes     Comment: occassionally    Drug use: Not Currently    Sexual activity: Yes     Partners: Male     Birth control/protection: Condom        Family History   Problem Relation Name Age of Onset    Breast cancer Father          Review of patient's allergies indicates:  No Known Allergies     Immunization History   Administered Date(s) Administered    COVID-19, MRNA, LN-S, PF (Pfizer) (Purple Cap) 2021, 2021    Hepatitis A 2014, 2014    Hepatitis A, Adult 2014, 2014    Hepatitis B, Adult 10/24/2013    Influenza 2013    Influenza - Quadrivalent 10/15/2015, 10/12/2016, 2021, 2022    Influenza - Quadrivalent - PF (6-35 months) 2019    Influenza - Quadrivalent - PF *Preferred* (6 months and older) 2017, 10/31/2018, 2021, 2022, 2022, 2023     "Influenza - Trivalent - Fluarix, Flulaval, Fluzone, Afluria - PF 11/20/2013, 11/13/2014, 11/20/2017    Meningococcal Conjugate (MCV4P) 07/23/2018, 01/28/2019    PPD Test 01/06/2014    Pneumococcal Conjugate - 13 Valent 11/13/2014    Pneumococcal Polysaccharide - 23 Valent 11/27/2013, 01/28/2019    Tdap 02/05/2015        Review of Systems   Constitutional: Negative.    HENT: Negative.     Eyes: Negative.    Respiratory: Negative.     Cardiovascular: Negative.    Gastrointestinal: Negative.    Genitourinary: Negative.    Musculoskeletal:  Positive for back pain, joint pain, myalgias and neck pain.   Skin: Negative.    Neurological: Negative.    Endo/Heme/Allergies: Negative.    Psychiatric/Behavioral: Negative.     All other systems reviewed and are negative.         Objective:      /71 (BP Location: Left arm, Patient Position: Sitting)   Pulse 94   Temp 98 °F (36.7 °C) (Oral)   Resp 12   Ht 5' 8" (1.727 m)   Wt 110.6 kg (243 lb 11.5 oz)   BMI 37.06 kg/m²      Physical Exam  Vitals reviewed.   Constitutional:       General: He is not in acute distress.     Appearance: Normal appearance. He is not toxic-appearing.   HENT:      Mouth/Throat:      Mouth: Mucous membranes are moist.      Pharynx: Oropharynx is clear.   Eyes:      Conjunctiva/sclera: Conjunctivae normal.   Cardiovascular:      Rate and Rhythm: Normal rate and regular rhythm.   Pulmonary:      Effort: Pulmonary effort is normal. No respiratory distress.      Breath sounds: Normal breath sounds.   Abdominal:      General: Abdomen is flat. Bowel sounds are normal.      Palpations: Abdomen is soft.   Musculoskeletal:         General: Normal range of motion.      Cervical back: Normal range of motion.   Lymphadenopathy:      Cervical: No cervical adenopathy.   Skin:     General: Skin is warm and dry.   Neurological:      General: No focal deficit present.      Mental Status: He is alert and oriented to person, place, and time. Mental status is at " baseline.   Psychiatric:         Mood and Affect: Mood normal.         Behavior: Behavior normal.          Labs:   Lab Results   Component Value Date    WBC 4.55 06/13/2024    HGB 15.4 06/13/2024    HCT 44.9 06/13/2024    MCV 94.9 (H) 06/13/2024     06/13/2024       CMP  Sodium   Date Value Ref Range Status   12/04/2024 136 136 - 145 mmol/L Final     Potassium   Date Value Ref Range Status   12/04/2024 4.3 3.5 - 5.1 mmol/L Final     Chloride   Date Value Ref Range Status   12/04/2024 103 100 - 109 mmol/L Final     Carbon Dioxide   Date Value Ref Range Status   12/04/2024 26 22 - 33 mmol/L Final     Blood Urea Nitrogen   Date Value Ref Range Status   12/04/2024 16 5 - 25 mg/dL Final     Creatinine   Date Value Ref Range Status   12/04/2024 0.94 0.73 - 1.18 mg/dL Final     Calcium   Date Value Ref Range Status   12/04/2024 10.0 8.8 - 10.6 mg/dL Final     Albumin   Date Value Ref Range Status   06/13/2024 4.1 3.4 - 4.8 g/dL Final     Bilirubin Total   Date Value Ref Range Status   06/13/2024 1.0 <=1.5 mg/dL Final     ALP   Date Value Ref Range Status   06/13/2024 77 40 - 150 unit/L Final     AST   Date Value Ref Range Status   06/13/2024 16 5 - 34 unit/L Final     ALT   Date Value Ref Range Status   06/13/2024 21 0 - 55 unit/L Final     Anion Gap   Date Value Ref Range Status   12/04/2024 7 (L) 8 - 16 mmol/L Final     eGFR   Date Value Ref Range Status   06/13/2024 >60 mL/min/1.73/m2 Final     Lab Results   Component Value Date    TSH 1.903 06/13/2024     Hep C Ab Interp   Date Value Ref Range Status   06/13/2024 Nonreactive Nonreactive Final     Syphilis Antibody   Date Value Ref Range Status   06/13/2024 Reactive (A) Nonreactive, Equivocal Final     RPR   Date Value Ref Range Status   06/13/2024 Non-Reactive Non-Reactive Final     Cholesterol Total   Date Value Ref Range Status   06/13/2024 186 <=200 mg/dL Final     HDL Cholesterol   Date Value Ref Range Status   06/13/2024 46 35 - 60 mg/dL Final      Triglyceride   Date Value Ref Range Status   06/13/2024 190 (H) 34 - 140 mg/dL Final     Cholesterol/HDL Ratio   Date Value Ref Range Status   06/13/2024 4 0 - 5 Final     Very Low Density Lipoprotein   Date Value Ref Range Status   06/13/2024 38  Final     LDL Cholesterol   Date Value Ref Range Status   06/13/2024 102.00 50.00 - 140.00 mg/dL Final     Vitamin D   Date Value Ref Range Status   06/13/2024 22 (L) 30 - 80 ng/mL Final     Results for orders placed or performed in visit on 06/13/24   CD4 Lymphocytes   Result Value Ref Range    Patient Age 63     WBC Absolute 4,550 4,500 - 11,500 /mm3    Lymph Percent 44 28 - 48 %    Lymph Absolute 2,002 1,260 - 5,520 x10(3)/mcL    CD4 % 31.5 %    CD4 Absolute 631 589 - 1,505 unit/L    T Cell Interp       Normal absolute lymphocyte count with normal absolute CD4+ lymphocyte count.    Gagandeep Stevens M.D.     Narrative    This test was developed and its performance characteristics determined by [facility]. It has not been cleared or approved by the US Food and Drug Administration. The FDA does not require this test to go through premarket FDA review. This test is used for clinical purposes. It should not be regarded as investigational or for research. This laboratory is certified under the Clinical Laboratory Improvement Amendments (CLIA) as qualified to perform high complexity clinical laboratory testing.     Results for orders placed or performed in visit on 06/13/24   HIV-1 RNA, Quantitative, PCR with Reflex to Genotype   Result Value Ref Range    HIV-1 RNA Detect/Quant, P Undetected Undetected copies/mL     Results for orders placed or performed in visit on 06/13/24   Quantiferon Gold TB   Result Value Ref Range    QuantiFERON-Tb Gold Plus Result Negative Negative    TB1 Ag minus Nil Result 0.01 IU/mL    TB2 Ag minus Nil Result -0.01 IU/mL    Mitogen minus Nil Result 8.24 IU/mL    Nil Result 0.05 IU/mL     No results found for this  or any previous visit.  Results for orders placed or performed in visit on 06/13/24   Urinalysis   Result Value Ref Range    Color, UA Yellow Yellow, Light-Yellow, Colorless, Straw, Dark-Yellow    Appearance, UA Clear Clear    Specific Gravity, UA 1.031 (H) 1.005 - 1.030    pH, UA 5.5 5.0 - 8.5    Protein, UA Trace (A) Negative    Glucose, UA Normal Negative, Normal    Ketones, UA Negative Negative    Blood, UA Negative Negative    Bilirubin, UA Negative Negative    Urobilinogen, UA Normal 0.2, 1.0, Normal    Nitrites, UA Negative Negative    Leukocyte Esterase, UA Negative Negative    WBC, UA 0-5 None Seen, 0-2, 3-5, 0-5 /HPF    Bacteria, UA None Seen None Seen, Trace /HPF    Squamous Epithelial Cells, UA Trace None Seen /HPF    Mucous, UA Few (A) None Seen /LPF    RBC, UA None Seen None Seen, 0-2, 3-5, 0-5 /HPF       Imaging: Reviewed most recent relevant imaging studies available, notable results highlighted in this note    Medications:     Current Outpatient Medications   Medication Instructions    BIKTARVY -25 mg (25 kg or greater) 1 tablet, Oral, Daily    cholecalciferol (vitamin D3) (VITAMIN D3) 2,000 Units, Oral, Daily    dextroamphetamine-amphetamine 30 mg Tab 1 tablet, 2 times daily    lisinopriL 10 mg, Oral, Daily    TRINTELLIX 10 mg Tab 1 tablet    VRAYLAR 3 mg Cap Take by mouth.       Assessment:       Problem List Items Addressed This Visit    None  Visit Diagnoses       Asymptomatic HIV infection, with no history of HIV-related illness    -  Primary    Relevant Medications    BIKTARVY -25 mg (25 kg or greater)    Other Relevant Orders    HIV-1 RNA, Quantitative, PCR with Reflex to Genotype    CD4 Lymphocytes    CBC Auto Differential    Comprehensive Metabolic Panel    Vitamin D deficiency        Relevant Medications    cholecalciferol, vitamin D3, (VITAMIN D3) 50 mcg (2,000 unit) Cap capsule               Plan:      Asymptomatic HIV infection, with no history of HIV-related illness  -      BIKTARVY -25 mg (25 kg or greater); Take 1 tablet by mouth once daily.  Dispense: 90 tablet; Refill: 1  -     HIV-1 RNA, Quantitative, PCR with Reflex to Genotype; Future; Expected date: 12/05/2024  -     CD4 Lymphocytes; Future; Expected date: 12/05/2024  -     CBC Auto Differential; Future; Expected date: 12/05/2024  -     Comprehensive Metabolic Panel; Future; Expected date: 12/05/2024  Diagnosed:   CD4 jeannette: >500  ART history: Genvoya, Biktarvy  DEEPALI: none    Adherence and sexual health counseling done.  Use condoms for all sexual encounters.  Blood precautions.   Continue Biktarvy as prescribed.  Labs within 1 week.  RTC 6 months with judy Mitchell in clinic visits.     HIV Wellness:  Anal pap: HRA 8/22 with annual f/u at [facility], 8/23, 12/24  Oral CT/GC: 5/20 Neg  Anal CT/GC: 5/20 Neg  Urine CT/GC: 2/22 Neg, 6/24  RPR: 2/22 serofast 1:2 titer, 4/23 NR, 6/24 NR  Ophth: 2022  DEXA:  Colon Cancer Screen:  Dr. Coleman colonoscopy    Vitamin D deficiency  -     cholecalciferol, vitamin D3, (VITAMIN D3) 50 mcg (2,000 unit) Cap capsule; Take 1 capsule (2,000 Units total) by mouth once daily.  Vitamin D3 2000 units daily.     Primary hypertension   Controlled.  Continue lisinopril 10 daily.   Medication compliance encouraged.  BP goal <130/80. Monitor BP at home & keep log of readings.  Low sodium diet, max 2 grams per day.  Weight control recommended.  Avoid illicit drug use and excessive alcohol intake.  Increase exercise activity, goal 30 minutes moderate exertion 3-5 times per week.  Stress reduction strategies such as meditation, deep breathing, stretching, prayer, social support system.

## 2025-06-04 ENCOUNTER — TELEPHONE (OUTPATIENT)
Dept: INFECTIOUS DISEASES | Facility: CLINIC | Age: 65
End: 2025-06-04
Payer: MEDICARE

## 2025-06-04 DIAGNOSIS — Z21 ASYMPTOMATIC HIV INFECTION, WITH NO HISTORY OF HIV-RELATED ILLNESS: ICD-10-CM

## 2025-06-04 RX ORDER — BICTEGRAVIR SODIUM, EMTRICITABINE, AND TENOFOVIR ALAFENAMIDE FUMARATE 50; 200; 25 MG/1; MG/1; MG/1
1 TABLET ORAL DAILY
Qty: 30 TABLET | Refills: 0 | Status: SHIPPED | OUTPATIENT
Start: 2025-06-04 | End: 2025-06-06 | Stop reason: SDUPTHER

## 2025-06-05 ENCOUNTER — LAB VISIT (OUTPATIENT)
Dept: LAB | Facility: HOSPITAL | Age: 65
End: 2025-06-05
Attending: NURSE PRACTITIONER
Payer: MEDICARE

## 2025-06-05 DIAGNOSIS — Z21 ASYMPTOMATIC HIV INFECTION, WITH NO HISTORY OF HIV-RELATED ILLNESS: ICD-10-CM

## 2025-06-05 LAB
ALBUMIN SERPL-MCNC: 4 G/DL (ref 3.4–4.8)
ALBUMIN/GLOB SERPL: 1.2 RATIO (ref 1.1–2)
ALP SERPL-CCNC: 80 UNIT/L (ref 40–150)
ALT SERPL-CCNC: 12 UNIT/L (ref 0–55)
ANION GAP SERPL CALC-SCNC: 11 MEQ/L
AST SERPL-CCNC: 14 UNIT/L (ref 11–45)
BASOPHILS # BLD AUTO: 0.03 X10(3)/MCL
BASOPHILS NFR BLD AUTO: 0.7 %
BILIRUB SERPL-MCNC: 0.9 MG/DL
BUN SERPL-MCNC: 12.9 MG/DL (ref 8.4–25.7)
CALCIUM SERPL-MCNC: 9.2 MG/DL (ref 8.8–10)
CHLORIDE SERPL-SCNC: 105 MMOL/L (ref 98–107)
CO2 SERPL-SCNC: 21 MMOL/L (ref 23–31)
CREAT SERPL-MCNC: 0.95 MG/DL (ref 0.72–1.25)
CREAT/UREA NIT SERPL: 14
EOSINOPHIL # BLD AUTO: 0.06 X10(3)/MCL (ref 0–0.9)
EOSINOPHIL NFR BLD AUTO: 1.4 %
ERYTHROCYTE [DISTWIDTH] IN BLOOD BY AUTOMATED COUNT: 12.3 % (ref 11.5–17)
GFR SERPLBLD CREATININE-BSD FMLA CKD-EPI: >60 ML/MIN/1.73/M2
GLOBULIN SER-MCNC: 3.4 GM/DL (ref 2.4–3.5)
GLUCOSE SERPL-MCNC: 99 MG/DL (ref 82–115)
HCT VFR BLD AUTO: 45.2 % (ref 42–52)
HGB BLD-MCNC: 15.3 G/DL (ref 14–18)
IMM GRANULOCYTES # BLD AUTO: 0.02 X10(3)/MCL (ref 0–0.04)
IMM GRANULOCYTES NFR BLD AUTO: 0.5 %
LYMPHOCYTES # BLD AUTO: 1.56 X10(3)/MCL (ref 0.6–4.6)
LYMPHOCYTES NFR BLD AUTO: 37.2 %
MCH RBC QN AUTO: 32.8 PG (ref 27–31)
MCHC RBC AUTO-ENTMCNC: 33.8 G/DL (ref 33–36)
MCV RBC AUTO: 97 FL (ref 80–94)
MONOCYTES # BLD AUTO: 0.45 X10(3)/MCL (ref 0.1–1.3)
MONOCYTES NFR BLD AUTO: 10.7 %
NEUTROPHILS # BLD AUTO: 2.07 X10(3)/MCL (ref 2.1–9.2)
NEUTROPHILS NFR BLD AUTO: 49.5 %
NRBC BLD AUTO-RTO: 0 %
PLATELET # BLD AUTO: 254 X10(3)/MCL (ref 130–400)
PMV BLD AUTO: 9.4 FL (ref 7.4–10.4)
POTASSIUM SERPL-SCNC: 3.9 MMOL/L (ref 3.5–5.1)
PROT SERPL-MCNC: 7.4 GM/DL (ref 5.8–7.6)
RBC # BLD AUTO: 4.66 X10(6)/MCL (ref 4.7–6.1)
SODIUM SERPL-SCNC: 137 MMOL/L (ref 136–145)
WBC # BLD AUTO: 4.19 X10(3)/MCL (ref 4.5–11.5)

## 2025-06-05 PROCEDURE — 87536 HIV-1 QUANT&REVRSE TRNSCRPJ: CPT

## 2025-06-05 PROCEDURE — 36415 COLL VENOUS BLD VENIPUNCTURE: CPT

## 2025-06-05 PROCEDURE — 80053 COMPREHEN METABOLIC PANEL: CPT

## 2025-06-05 PROCEDURE — 86360 T CELL ABSOLUTE COUNT/RATIO: CPT

## 2025-06-05 PROCEDURE — 85025 COMPLETE CBC W/AUTO DIFF WBC: CPT

## 2025-06-06 ENCOUNTER — DOCUMENTATION ONLY (OUTPATIENT)
Dept: INFECTIOUS DISEASES | Facility: CLINIC | Age: 65
End: 2025-06-06

## 2025-06-06 ENCOUNTER — OFFICE VISIT (OUTPATIENT)
Dept: INFECTIOUS DISEASES | Facility: CLINIC | Age: 65
End: 2025-06-06
Payer: MEDICARE

## 2025-06-06 VITALS
TEMPERATURE: 98 F | RESPIRATION RATE: 10 BRPM | HEIGHT: 68 IN | HEART RATE: 71 BPM | SYSTOLIC BLOOD PRESSURE: 131 MMHG | WEIGHT: 232.5 LBS | DIASTOLIC BLOOD PRESSURE: 75 MMHG | BODY MASS INDEX: 35.24 KG/M2

## 2025-06-06 DIAGNOSIS — Z11.3 ENCOUNTER FOR SCREENING FOR INFECTIONS WITH A PREDOMINANTLY SEXUAL MODE OF TRANSMISSION: ICD-10-CM

## 2025-06-06 DIAGNOSIS — Z21 ASYMPTOMATIC HIV INFECTION, WITH NO HISTORY OF HIV-RELATED ILLNESS: Primary | ICD-10-CM

## 2025-06-06 DIAGNOSIS — Z12.11 COLON CANCER SCREENING: ICD-10-CM

## 2025-06-06 DIAGNOSIS — R79.9 ABNORMAL FINDING OF BLOOD CHEMISTRY, UNSPECIFIED: ICD-10-CM

## 2025-06-06 DIAGNOSIS — E55.9 VITAMIN D DEFICIENCY: ICD-10-CM

## 2025-06-06 DIAGNOSIS — I10 PRIMARY HYPERTENSION: ICD-10-CM

## 2025-06-06 LAB
CD3 CELLS # BLD: 1090 CELLS/MCL (ref 550–2202)
CD3 CELLS NFR BLD: 75 % (ref 58–86)
CD3+CD4+ CELLS # BLD: 534 CELLS/MCL (ref 365–1437)
CD3+CD4+ CELLS NFR BLD: 37 % (ref 32–64)
CD3+CD4+ CELLS/CD3+CD8+ CLL BLD: 0.9 %
CD3+CD8+ CELLS # BLD: 607 CELLS/MCL (ref 80–846)
CD3+CD8+ CELLS NFR BLD: 42 % (ref 8–40)
CD45 CELLS # BLD: 1.46 THOU/MCL (ref 0.82–2.84)
HIV1 RNA # PLAS NAA DL=20: 29 COPIES/ML

## 2025-06-06 PROCEDURE — 99214 OFFICE O/P EST MOD 30 MIN: CPT | Mod: PBBFAC | Performed by: NURSE PRACTITIONER

## 2025-06-06 RX ORDER — ORAL SEMAGLUTIDE 7 MG/1
7 TABLET ORAL
COMMUNITY
Start: 2025-05-19

## 2025-06-06 RX ORDER — LISINOPRIL 10 MG/1
10 TABLET ORAL DAILY
Qty: 90 TABLET | Refills: 3 | Status: SHIPPED | OUTPATIENT
Start: 2025-06-06

## 2025-06-06 RX ORDER — ACETAMINOPHEN 500 MG
2000 TABLET ORAL DAILY
Qty: 90 CAPSULE | Refills: 3 | Status: SHIPPED | OUTPATIENT
Start: 2025-06-06

## 2025-06-06 RX ORDER — ONDANSETRON 4 MG/1
4 TABLET, FILM COATED ORAL EVERY 8 HOURS PRN
COMMUNITY
Start: 2024-12-13

## 2025-06-06 RX ORDER — BICTEGRAVIR SODIUM, EMTRICITABINE, AND TENOFOVIR ALAFENAMIDE FUMARATE 50; 200; 25 MG/1; MG/1; MG/1
1 TABLET ORAL DAILY
Qty: 90 TABLET | Refills: 1 | Status: SHIPPED | OUTPATIENT
Start: 2025-06-06

## 2025-07-18 ENCOUNTER — TELEPHONE (OUTPATIENT)
Dept: INFECTIOUS DISEASES | Facility: CLINIC | Age: 65
End: 2025-07-18
Payer: MEDICARE

## 2025-07-18 NOTE — TELEPHONE ENCOUNTER
Received notice from [facility] that they've been unable to reach pt to schedule appt after multiple attempts. Left message for pt to return call. Will also send message in portal for pt to return call.

## 2025-07-21 NOTE — TELEPHONE ENCOUNTER
Phoned patient. No answer. Message left on voice mail to contact clinic regarding scheduling follow-up Gastroenterology appt.     [facility]Specialty clinic [phone]

## 2025-07-22 NOTE — TELEPHONE ENCOUNTER
Phoned patient. No answer. Message left on voice mail to contact clinic regarding scheduling follow-up Gastroenterology appt.

## 2025-07-23 NOTE — TELEPHONE ENCOUNTER
Phoned patient. Discussed followup appt with gastroenterology. Informed of clinic trying to contact patient for appt scheduling. [facility] will schedule appt soon. Offered to give phone number to [facility]. [facility] has phone number on voice mail that was left on his phone. Appreciative of the call.